# Patient Record
Sex: MALE | Race: BLACK OR AFRICAN AMERICAN | NOT HISPANIC OR LATINO | Employment: UNEMPLOYED | URBAN - METROPOLITAN AREA
[De-identification: names, ages, dates, MRNs, and addresses within clinical notes are randomized per-mention and may not be internally consistent; named-entity substitution may affect disease eponyms.]

---

## 2018-10-26 ENCOUNTER — HOSPITAL ENCOUNTER (INPATIENT)
Facility: HOSPITAL | Age: 58
LOS: 3 days | Discharge: HOME | DRG: 378 | End: 2018-10-29
Attending: EMERGENCY MEDICINE | Admitting: FAMILY MEDICINE
Payer: MEDICAID

## 2018-10-26 DIAGNOSIS — K26.0 ACUTE DUODENAL ULCER WITH BLEEDING: ICD-10-CM

## 2018-10-26 DIAGNOSIS — K92.2 GI BLEED: ICD-10-CM

## 2018-10-26 DIAGNOSIS — K92.1 MELENA: Primary | ICD-10-CM

## 2018-10-26 LAB
ABO + RH BLD: NORMAL
ALBUMIN SERPL BCP-MCNC: 2.4 G/DL
ALP SERPL-CCNC: 51 U/L
ALT SERPL W/O P-5'-P-CCNC: 43 U/L
ANION GAP SERPL CALC-SCNC: 7 MMOL/L
APTT BLDCRRT: <21 SEC
AST SERPL-CCNC: 41 U/L
BASOPHILS # BLD AUTO: 0.01 K/UL
BASOPHILS NFR BLD: 0.1 %
BILIRUB SERPL-MCNC: 0.2 MG/DL
BILIRUB UR QL STRIP: NEGATIVE
BLD GP AB SCN CELLS X3 SERPL QL: NORMAL
BUN SERPL-MCNC: 30 MG/DL
CALCIUM SERPL-MCNC: 7.8 MG/DL
CHLORIDE SERPL-SCNC: 108 MMOL/L
CLARITY UR: CLEAR
CO2 SERPL-SCNC: 22 MMOL/L
COLOR UR: YELLOW
CREAT SERPL-MCNC: 1 MG/DL
DIFFERENTIAL METHOD: ABNORMAL
EOSINOPHIL # BLD AUTO: 0 K/UL
EOSINOPHIL NFR BLD: 0.1 %
ERYTHROCYTE [DISTWIDTH] IN BLOOD BY AUTOMATED COUNT: 13.1 %
EST. GFR  (AFRICAN AMERICAN): >60 ML/MIN/1.73 M^2
EST. GFR  (NON AFRICAN AMERICAN): >60 ML/MIN/1.73 M^2
GLUCOSE SERPL-MCNC: 110 MG/DL
GLUCOSE UR QL STRIP: NEGATIVE
HCT VFR BLD AUTO: 21.8 %
HGB BLD-MCNC: 7.7 G/DL
HGB UR QL STRIP: ABNORMAL
INR PPP: 1.1
KETONES UR QL STRIP: NEGATIVE
LEUKOCYTE ESTERASE UR QL STRIP: NEGATIVE
LIPASE SERPL-CCNC: 37 U/L
LYMPHOCYTES # BLD AUTO: 1.9 K/UL
LYMPHOCYTES NFR BLD: 12.2 %
MCH RBC QN AUTO: 35.2 PG
MCHC RBC AUTO-ENTMCNC: 35.3 G/DL
MCV RBC AUTO: 100 FL
MICROSCOPIC COMMENT: NORMAL
MONOCYTES # BLD AUTO: 1.8 K/UL
MONOCYTES NFR BLD: 12 %
NEUTROPHILS # BLD AUTO: 11.6 K/UL
NEUTROPHILS NFR BLD: 75.6 %
NITRITE UR QL STRIP: NEGATIVE
PH UR STRIP: 6 [PH] (ref 5–8)
PLATELET # BLD AUTO: 217 K/UL
PMV BLD AUTO: 10.1 FL
POTASSIUM SERPL-SCNC: 3.9 MMOL/L
PROT SERPL-MCNC: 5 G/DL
PROT UR QL STRIP: NEGATIVE
PROTHROMBIN TIME: 10.9 SEC
RBC # BLD AUTO: 2.19 M/UL
RBC #/AREA URNS HPF: 2 /HPF (ref 0–4)
SODIUM SERPL-SCNC: 137 MMOL/L
SP GR UR STRIP: 1.01 (ref 1–1.03)
TROPONIN I SERPL DL<=0.01 NG/ML-MCNC: 0.02 NG/ML
URN SPEC COLLECT METH UR: ABNORMAL
UROBILINOGEN UR STRIP-ACNC: NEGATIVE EU/DL
WBC # BLD AUTO: 15.3 K/UL

## 2018-10-26 PROCEDURE — 63600175 PHARM REV CODE 636 W HCPCS: Performed by: EMERGENCY MEDICINE

## 2018-10-26 PROCEDURE — C9113 INJ PANTOPRAZOLE SODIUM, VIA: HCPCS | Performed by: EMERGENCY MEDICINE

## 2018-10-26 PROCEDURE — 93005 ELECTROCARDIOGRAM TRACING: CPT

## 2018-10-26 PROCEDURE — 96375 TX/PRO/DX INJ NEW DRUG ADDON: CPT

## 2018-10-26 PROCEDURE — 96360 HYDRATION IV INFUSION INIT: CPT | Mod: 59

## 2018-10-26 PROCEDURE — 25000003 PHARM REV CODE 250: Performed by: EMERGENCY MEDICINE

## 2018-10-26 PROCEDURE — 85730 THROMBOPLASTIN TIME PARTIAL: CPT

## 2018-10-26 PROCEDURE — 83690 ASSAY OF LIPASE: CPT

## 2018-10-26 PROCEDURE — 93010 ELECTROCARDIOGRAM REPORT: CPT | Mod: ,,, | Performed by: INTERNAL MEDICINE

## 2018-10-26 PROCEDURE — 96361 HYDRATE IV INFUSION ADD-ON: CPT

## 2018-10-26 PROCEDURE — 99291 CRITICAL CARE FIRST HOUR: CPT | Mod: 25

## 2018-10-26 PROCEDURE — 96365 THER/PROPH/DIAG IV INF INIT: CPT

## 2018-10-26 PROCEDURE — 85610 PROTHROMBIN TIME: CPT

## 2018-10-26 PROCEDURE — 85025 COMPLETE CBC W/AUTO DIFF WBC: CPT

## 2018-10-26 PROCEDURE — 86901 BLOOD TYPING SEROLOGIC RH(D): CPT

## 2018-10-26 PROCEDURE — 36415 COLL VENOUS BLD VENIPUNCTURE: CPT

## 2018-10-26 PROCEDURE — 81000 URINALYSIS NONAUTO W/SCOPE: CPT

## 2018-10-26 PROCEDURE — 84484 ASSAY OF TROPONIN QUANT: CPT

## 2018-10-26 PROCEDURE — 80053 COMPREHEN METABOLIC PANEL: CPT

## 2018-10-26 PROCEDURE — P9016 RBC LEUKOCYTES REDUCED: HCPCS

## 2018-10-26 PROCEDURE — 86920 COMPATIBILITY TEST SPIN: CPT

## 2018-10-26 PROCEDURE — 36430 TRANSFUSION BLD/BLD COMPNT: CPT

## 2018-10-26 PROCEDURE — 96376 TX/PRO/DX INJ SAME DRUG ADON: CPT

## 2018-10-26 PROCEDURE — 11000001 HC ACUTE MED/SURG PRIVATE ROOM

## 2018-10-26 PROCEDURE — 96366 THER/PROPH/DIAG IV INF ADDON: CPT

## 2018-10-26 RX ORDER — ONDANSETRON 2 MG/ML
4 INJECTION INTRAMUSCULAR; INTRAVENOUS
Status: COMPLETED | OUTPATIENT
Start: 2018-10-26 | End: 2018-10-26

## 2018-10-26 RX ORDER — PANTOPRAZOLE SODIUM 40 MG/10ML
80 INJECTION, POWDER, LYOPHILIZED, FOR SOLUTION INTRAVENOUS
Status: COMPLETED | OUTPATIENT
Start: 2018-10-26 | End: 2018-10-26

## 2018-10-26 RX ORDER — HYDROCODONE BITARTRATE AND ACETAMINOPHEN 500; 5 MG/1; MG/1
TABLET ORAL
Status: DISCONTINUED | OUTPATIENT
Start: 2018-10-26 | End: 2018-10-29 | Stop reason: HOSPADM

## 2018-10-26 RX ORDER — NIFEDIPINE 10 MG/1
20 CAPSULE ORAL EVERY 8 HOURS
COMMUNITY

## 2018-10-26 RX ADMIN — DEXTROSE 8 MG/HR: 50 INJECTION, SOLUTION INTRAVENOUS at 11:10

## 2018-10-26 RX ADMIN — ONDANSETRON 4 MG: 2 INJECTION INTRAMUSCULAR; INTRAVENOUS at 08:10

## 2018-10-26 RX ADMIN — SODIUM CHLORIDE 1000 ML: 0.9 INJECTION, SOLUTION INTRAVENOUS at 10:10

## 2018-10-26 RX ADMIN — PANTOPRAZOLE SODIUM 80 MG: 40 INJECTION, POWDER, FOR SOLUTION INTRAVENOUS at 10:10

## 2018-10-27 ENCOUNTER — ANESTHESIA EVENT (OUTPATIENT)
Dept: ENDOSCOPY | Facility: HOSPITAL | Age: 58
DRG: 378 | End: 2018-10-27
Payer: MEDICAID

## 2018-10-27 ENCOUNTER — ANESTHESIA (OUTPATIENT)
Dept: ENDOSCOPY | Facility: HOSPITAL | Age: 58
DRG: 378 | End: 2018-10-27
Payer: MEDICAID

## 2018-10-27 DIAGNOSIS — Z12.11 COLON CANCER SCREENING: Primary | ICD-10-CM

## 2018-10-27 DIAGNOSIS — K26.9 DUODENAL ULCER: Primary | ICD-10-CM

## 2018-10-27 DIAGNOSIS — K22.9 IRREGULAR Z LINE OF ESOPHAGUS: ICD-10-CM

## 2018-10-27 PROBLEM — Z78.9 ALCOHOL USE: Status: ACTIVE | Noted: 2018-10-27

## 2018-10-27 PROBLEM — Z72.0 TOBACCO USE: Status: ACTIVE | Noted: 2018-10-27

## 2018-10-27 PROBLEM — I10 HYPERTENSION: Status: ACTIVE | Noted: 2018-10-27

## 2018-10-27 PROBLEM — K26.0 ACUTE DUODENAL ULCER WITH BLEEDING: Status: ACTIVE | Noted: 2018-10-27

## 2018-10-27 PROBLEM — D72.829 LEUKOCYTOSIS: Status: ACTIVE | Noted: 2018-10-27

## 2018-10-27 LAB
BASOPHILS # BLD AUTO: 0.01 K/UL
BASOPHILS # BLD AUTO: 0.02 K/UL
BASOPHILS # BLD AUTO: 0.02 K/UL
BASOPHILS NFR BLD: 0.1 %
BASOPHILS NFR BLD: 0.1 %
BASOPHILS NFR BLD: 0.2 %
BLD PROD TYP BPU: NORMAL
BLOOD UNIT EXPIRATION DATE: NORMAL
BLOOD UNIT TYPE CODE: 6200
BLOOD UNIT TYPE: NORMAL
CODING SYSTEM: NORMAL
DIFFERENTIAL METHOD: ABNORMAL
DISPENSE STATUS: NORMAL
EOSINOPHIL # BLD AUTO: 0 K/UL
EOSINOPHIL # BLD AUTO: 0 K/UL
EOSINOPHIL # BLD AUTO: 0.1 K/UL
EOSINOPHIL NFR BLD: 0.1 %
EOSINOPHIL NFR BLD: 0.1 %
EOSINOPHIL NFR BLD: 0.4 %
ERYTHROCYTE [DISTWIDTH] IN BLOOD BY AUTOMATED COUNT: 14.6 %
ERYTHROCYTE [DISTWIDTH] IN BLOOD BY AUTOMATED COUNT: 15.7 %
ERYTHROCYTE [DISTWIDTH] IN BLOOD BY AUTOMATED COUNT: 16.3 %
HCT VFR BLD AUTO: 22.5 %
HCT VFR BLD AUTO: 22.9 %
HCT VFR BLD AUTO: 24.5 %
HGB BLD-MCNC: 7.8 G/DL
HGB BLD-MCNC: 7.9 G/DL
HGB BLD-MCNC: 8.1 G/DL
HGB BLD-MCNC: 8.4 G/DL
LYMPHOCYTES # BLD AUTO: 2.8 K/UL
LYMPHOCYTES # BLD AUTO: 3.1 K/UL
LYMPHOCYTES # BLD AUTO: 4 K/UL
LYMPHOCYTES NFR BLD: 21.1 %
LYMPHOCYTES NFR BLD: 21.7 %
LYMPHOCYTES NFR BLD: 25.3 %
MCH RBC QN AUTO: 32.1 PG
MCH RBC QN AUTO: 33.2 PG
MCH RBC QN AUTO: 33.9 PG
MCHC RBC AUTO-ENTMCNC: 34.3 G/DL
MCHC RBC AUTO-ENTMCNC: 35.1 G/DL
MCHC RBC AUTO-ENTMCNC: 35.4 G/DL
MCV RBC AUTO: 94 FL
MCV RBC AUTO: 94 FL
MCV RBC AUTO: 97 FL
MONOCYTES # BLD AUTO: 1.2 K/UL
MONOCYTES # BLD AUTO: 1.6 K/UL
MONOCYTES # BLD AUTO: 1.9 K/UL
MONOCYTES NFR BLD: 12.1 %
MONOCYTES NFR BLD: 13.3 %
MONOCYTES NFR BLD: 7.9 %
NEUTROPHILS # BLD AUTO: 10.4 K/UL
NEUTROPHILS # BLD AUTO: 8.8 K/UL
NEUTROPHILS # BLD AUTO: 9.3 K/UL
NEUTROPHILS NFR BLD: 64.8 %
NEUTROPHILS NFR BLD: 66.2 %
NEUTROPHILS NFR BLD: 66.6 %
NUM UNITS TRANS PACKED RBC: NORMAL
PLATELET # BLD AUTO: 152 K/UL
PLATELET # BLD AUTO: 161 K/UL
PLATELET # BLD AUTO: 179 K/UL
PMV BLD AUTO: 10.6 FL
PMV BLD AUTO: 9.9 FL
PMV BLD AUTO: 9.9 FL
RBC # BLD AUTO: 2.33 M/UL
RBC # BLD AUTO: 2.44 M/UL
RBC # BLD AUTO: 2.62 M/UL
WBC # BLD AUTO: 13.24 K/UL
WBC # BLD AUTO: 14.4 K/UL
WBC # BLD AUTO: 15.65 K/UL

## 2018-10-27 PROCEDURE — 37000009 HC ANESTHESIA EA ADD 15 MINS: Performed by: INTERNAL MEDICINE

## 2018-10-27 PROCEDURE — 0W3P8ZZ CONTROL BLEEDING IN GASTROINTESTINAL TRACT, VIA NATURAL OR ARTIFICIAL OPENING ENDOSCOPIC: ICD-10-PCS | Performed by: INTERNAL MEDICINE

## 2018-10-27 PROCEDURE — 63600175 PHARM REV CODE 636 W HCPCS: Performed by: NURSE ANESTHETIST, CERTIFIED REGISTERED

## 2018-10-27 PROCEDURE — 27201012 HC FORCEPS, HOT/COLD, DISP: Performed by: INTERNAL MEDICINE

## 2018-10-27 PROCEDURE — 37000008 HC ANESTHESIA 1ST 15 MINUTES: Performed by: INTERNAL MEDICINE

## 2018-10-27 PROCEDURE — 27201028 HC NEEDLE, SCLERO: Performed by: INTERNAL MEDICINE

## 2018-10-27 PROCEDURE — 43239 EGD BIOPSY SINGLE/MULTIPLE: CPT | Performed by: INTERNAL MEDICINE

## 2018-10-27 PROCEDURE — P9016 RBC LEUKOCYTES REDUCED: HCPCS

## 2018-10-27 PROCEDURE — 43255 EGD CONTROL BLEEDING ANY: CPT | Mod: 59,,, | Performed by: INTERNAL MEDICINE

## 2018-10-27 PROCEDURE — C9113 INJ PANTOPRAZOLE SODIUM, VIA: HCPCS | Performed by: EMERGENCY MEDICINE

## 2018-10-27 PROCEDURE — 25000003 PHARM REV CODE 250: Performed by: FAMILY MEDICINE

## 2018-10-27 PROCEDURE — 99232 SBSQ HOSP IP/OBS MODERATE 35: CPT | Mod: 25,,, | Performed by: INTERNAL MEDICINE

## 2018-10-27 PROCEDURE — 25000003 PHARM REV CODE 250: Performed by: INTERNAL MEDICINE

## 2018-10-27 PROCEDURE — 21400001 HC TELEMETRY ROOM

## 2018-10-27 PROCEDURE — 25000003 PHARM REV CODE 250: Performed by: EMERGENCY MEDICINE

## 2018-10-27 PROCEDURE — 85018 HEMOGLOBIN: CPT

## 2018-10-27 PROCEDURE — 43239 EGD BIOPSY SINGLE/MULTIPLE: CPT | Mod: ,,, | Performed by: INTERNAL MEDICINE

## 2018-10-27 PROCEDURE — 36430 TRANSFUSION BLD/BLD COMPNT: CPT

## 2018-10-27 PROCEDURE — 85025 COMPLETE CBC W/AUTO DIFF WBC: CPT | Mod: 91

## 2018-10-27 PROCEDURE — 43255 EGD CONTROL BLEEDING ANY: CPT | Performed by: INTERNAL MEDICINE

## 2018-10-27 PROCEDURE — 27201038 HC PROBE, BI-POLAR: Performed by: INTERNAL MEDICINE

## 2018-10-27 PROCEDURE — 88305 TISSUE EXAM BY PATHOLOGIST: CPT | Performed by: PATHOLOGY

## 2018-10-27 PROCEDURE — 94761 N-INVAS EAR/PLS OXIMETRY MLT: CPT

## 2018-10-27 PROCEDURE — 0DB98ZX EXCISION OF DUODENUM, VIA NATURAL OR ARTIFICIAL OPENING ENDOSCOPIC, DIAGNOSTIC: ICD-10-PCS | Performed by: INTERNAL MEDICINE

## 2018-10-27 PROCEDURE — 25000003 PHARM REV CODE 250: Performed by: NURSE ANESTHETIST, CERTIFIED REGISTERED

## 2018-10-27 PROCEDURE — S4991 NICOTINE PATCH NONLEGEND: HCPCS | Performed by: FAMILY MEDICINE

## 2018-10-27 PROCEDURE — 63600175 PHARM REV CODE 636 W HCPCS: Performed by: EMERGENCY MEDICINE

## 2018-10-27 PROCEDURE — 88305 TISSUE EXAM BY PATHOLOGIST: CPT | Mod: 26,,, | Performed by: PATHOLOGY

## 2018-10-27 RX ORDER — HYDROCODONE BITARTRATE AND ACETAMINOPHEN 500; 5 MG/1; MG/1
TABLET ORAL
Status: DISCONTINUED | OUTPATIENT
Start: 2018-10-27 | End: 2018-10-29 | Stop reason: HOSPADM

## 2018-10-27 RX ORDER — SODIUM CHLORIDE, SODIUM LACTATE, POTASSIUM CHLORIDE, CALCIUM CHLORIDE 600; 310; 30; 20 MG/100ML; MG/100ML; MG/100ML; MG/100ML
INJECTION, SOLUTION INTRAVENOUS CONTINUOUS
Status: DISCONTINUED | OUTPATIENT
Start: 2018-10-27 | End: 2018-10-27

## 2018-10-27 RX ORDER — ACETAMINOPHEN 325 MG/1
650 TABLET ORAL EVERY 8 HOURS PRN
Status: DISCONTINUED | OUTPATIENT
Start: 2018-10-27 | End: 2018-10-29 | Stop reason: HOSPADM

## 2018-10-27 RX ORDER — POLYETHYLENE GLYCOL 3350, SODIUM SULFATE ANHYDROUS, SODIUM BICARBONATE, SODIUM CHLORIDE, POTASSIUM CHLORIDE 236; 22.74; 6.74; 5.86; 2.97 G/4L; G/4L; G/4L; G/4L; G/4L
4 POWDER, FOR SOLUTION ORAL ONCE
Qty: 4000 ML | Refills: 0 | Status: SHIPPED | OUTPATIENT
Start: 2018-10-27 | End: 2018-10-27

## 2018-10-27 RX ORDER — IBUPROFEN 200 MG
1 TABLET ORAL DAILY
Status: DISCONTINUED | OUTPATIENT
Start: 2018-10-27 | End: 2018-10-29 | Stop reason: HOSPADM

## 2018-10-27 RX ORDER — LIDOCAINE HYDROCHLORIDE 10 MG/ML
INJECTION INFILTRATION; PERINEURAL
Status: DISCONTINUED | OUTPATIENT
Start: 2018-10-27 | End: 2018-10-27

## 2018-10-27 RX ORDER — PROPOFOL 10 MG/ML
VIAL (ML) INTRAVENOUS
Status: DISCONTINUED | OUTPATIENT
Start: 2018-10-27 | End: 2018-10-27

## 2018-10-27 RX ORDER — HYDROCODONE BITARTRATE AND ACETAMINOPHEN 5; 325 MG/1; MG/1
1 TABLET ORAL EVERY 4 HOURS PRN
Status: DISCONTINUED | OUTPATIENT
Start: 2018-10-27 | End: 2018-10-29 | Stop reason: HOSPADM

## 2018-10-27 RX ORDER — SODIUM CHLORIDE 9 MG/ML
INJECTION, SOLUTION INTRAVENOUS CONTINUOUS
Status: DISCONTINUED | OUTPATIENT
Start: 2018-10-27 | End: 2018-10-28

## 2018-10-27 RX ORDER — SODIUM CHLORIDE, SODIUM LACTATE, POTASSIUM CHLORIDE, CALCIUM CHLORIDE 600; 310; 30; 20 MG/100ML; MG/100ML; MG/100ML; MG/100ML
INJECTION, SOLUTION INTRAVENOUS CONTINUOUS PRN
Status: DISCONTINUED | OUTPATIENT
Start: 2018-10-27 | End: 2018-10-27

## 2018-10-27 RX ORDER — ONDANSETRON 2 MG/ML
4 INJECTION INTRAMUSCULAR; INTRAVENOUS EVERY 8 HOURS PRN
Status: DISCONTINUED | OUTPATIENT
Start: 2018-10-27 | End: 2018-10-29 | Stop reason: HOSPADM

## 2018-10-27 RX ORDER — SODIUM CHLORIDE 0.9 % (FLUSH) 0.9 %
3 SYRINGE (ML) INJECTION
Status: DISCONTINUED | OUTPATIENT
Start: 2018-10-27 | End: 2018-10-27 | Stop reason: HOSPADM

## 2018-10-27 RX ADMIN — PROPOFOL 50 MG: 10 INJECTION, EMULSION INTRAVENOUS at 02:10

## 2018-10-27 RX ADMIN — LIDOCAINE HYDROCHLORIDE 2 ML: 10 INJECTION, SOLUTION INFILTRATION; PERINEURAL at 02:10

## 2018-10-27 RX ADMIN — SODIUM CHLORIDE: 0.9 INJECTION, SOLUTION INTRAVENOUS at 02:10

## 2018-10-27 RX ADMIN — Medication 1 PATCH: at 10:10

## 2018-10-27 RX ADMIN — PROPOFOL 100 MG: 10 INJECTION, EMULSION INTRAVENOUS at 02:10

## 2018-10-27 RX ADMIN — DEXTROSE 8 MG/HR: 50 INJECTION, SOLUTION INTRAVENOUS at 04:10

## 2018-10-27 RX ADMIN — PROPOFOL 75 MG: 10 INJECTION, EMULSION INTRAVENOUS at 02:10

## 2018-10-27 RX ADMIN — SODIUM CHLORIDE: 0.9 INJECTION, SOLUTION INTRAVENOUS at 12:10

## 2018-10-27 RX ADMIN — SODIUM CHLORIDE, SODIUM LACTATE, POTASSIUM CHLORIDE, AND CALCIUM CHLORIDE: 600; 310; 30; 20 INJECTION, SOLUTION INTRAVENOUS at 02:10

## 2018-10-27 RX ADMIN — SODIUM CHLORIDE, SODIUM LACTATE, POTASSIUM CHLORIDE, AND CALCIUM CHLORIDE: .6; .31; .03; .02 INJECTION, SOLUTION INTRAVENOUS at 02:10

## 2018-10-27 NOTE — ASSESSMENT & PLAN NOTE
Melena, without ongoing bleeding at this time but did not respond appropriately to 2 units pRBCs.   -Plan for EGD today to eval source of melena  -Monitor Hb closely and maintain >8  -Continue PPI IV  -Avoid ASA/NSAIDS

## 2018-10-27 NOTE — ED NOTES
Pt denies any injuries from falls, states he never hit his head, denies LOC, denies bruises/abrasions/complaints.  Pt c/o feeling generalized weakness and cramping discomfort with BMs only.  No obvious injuries observed on eval, extremities x4 +ROM without pain, CSMs intact.

## 2018-10-27 NOTE — SUBJECTIVE & OBJECTIVE
Past Medical History:   Diagnosis Date    Cardiac hypertrophy     Hypertension        History reviewed. No pertinent surgical history.    Review of patient's allergies indicates:  No Known Allergies    No current facility-administered medications on file prior to encounter.      Current Outpatient Medications on File Prior to Encounter   Medication Sig    NIFEdipine (PROCARDIA) 10 MG Cap Take 20 mg by mouth every 8 (eight) hours.     Family History     None        Tobacco Use    Smoking status: Current Every Day Smoker     Packs/day: 1.00     Types: Cigarettes    Smokeless tobacco: Never Used   Substance and Sexual Activity    Alcohol use: Yes     Alcohol/week: 3.6 oz     Types: 6 Cans of beer per week    Drug use: No    Sexual activity: Not on file     Review of Systems   Constitutional: Negative.    HENT: Negative.    Eyes: Negative.    Respiratory: Negative.    Cardiovascular: Negative.    Gastrointestinal: Positive for abdominal pain, blood in stool and diarrhea.   Endocrine: Negative.    Genitourinary: Negative.    Musculoskeletal: Negative.    Skin: Negative.    Allergic/Immunologic: Negative.    Neurological: Negative.    Hematological: Negative.    Psychiatric/Behavioral: Negative.      Objective:     Vital Signs (Most Recent):  Temp: 98.7 °F (37.1 °C) (10/27/18 0015)  Pulse: (!) 112 (10/27/18 0015)  Resp: 20 (10/27/18 0015)  BP: 128/83 (10/27/18 0015)  SpO2: 100 % (10/27/18 0015) Vital Signs (24h Range):  Temp:  [98.7 °F (37.1 °C)-98.8 °F (37.1 °C)] 98.7 °F (37.1 °C)  Pulse:  [107-123] 112  Resp:  [16-33] 20  SpO2:  [100 %] 100 %  BP: (109-148)/(55-85) 128/83     Weight: 103 kg (227 lb)  Body mass index is 31.66 kg/m².    Physical Exam   Constitutional: He is oriented to person, place, and time. He appears well-developed and well-nourished.   Looks older than his stated age   HENT:   Head: Normocephalic and atraumatic.   Right Ear: External ear normal.   Left Ear: External ear normal.   Nose: Nose  normal.   Mouth/Throat: Oropharynx is clear and moist.   Eyes: Conjunctivae and EOM are normal. Pupils are equal, round, and reactive to light.   Neck: Normal range of motion. Neck supple.   Cardiovascular: Normal rate, regular rhythm, normal heart sounds and intact distal pulses.   Pulmonary/Chest: Effort normal and breath sounds normal.   Abdominal: Soft. He exhibits no mass. There is tenderness (epigastric). There is no rebound.   Genitourinary: Rectal exam shows guaiac positive stool.   Musculoskeletal: Normal range of motion.   Neurological: He is alert and oriented to person, place, and time.   Skin: Skin is warm and dry. Capillary refill takes less than 2 seconds.   Psychiatric: He has a normal mood and affect.         CRANIAL NERVES     CN III, IV, VI   Pupils are equal, round, and reactive to light.  Extraocular motions are normal.        Significant Labs:   Recent Lab Results       10/26/18  2250   10/26/18  2201   10/26/18  1850        Unit Blood Type Code   6200[P]          6200[P]       Unit Expiration   [id number][P]          [id number][P]       Unit Blood Type   A POS[P]          A POS[P]       Albumin     2.4     Alkaline Phosphatase     51     ALT     43     Anion Gap     7     Appearance, UA Clear         aPTT     <21.0  Comment:  aPTT therapeutic range = 39-69 seconds     AST     41     Baso #     0.01     Basophil%     0.1     Bilirubin (UA) Negative         Total Bilirubin     0.2  Comment:  For infants and newborns, interpretation of results should be based  on gestational age, weight and in agreement with clinical  observations.  Premature Infant recommended reference ranges:  Up to 24 hours.............<8.0 mg/dL  Up to 48 hours............<12.0 mg/dL  3-5 days..................<15.0 mg/dL  6-29 days.................<15.0 mg/dL       BUN, Bld     30     Calcium     7.8     Chloride     108     CO2     22     CODING SYSTEM   ENTL162[P]          MEZS817[P]       Color, UA Yellow          Creatinine     1.0     Differential Method     Automated     DISPENSE STATUS   CROSSMATCHED[P]          ISSUED[P]       eGFR if      >60     eGFR if non      >60  Comment:  Calculation used to obtain the estimated glomerular filtration  rate (eGFR) is the CKD-EPI equation.        Eos #     0.0     Eosinophil%     0.1     Glucose     110     Glucose, UA Negative         Gran # (ANC)     11.6     Gran%     75.6     Group & Rh   AB POS       Hematocrit     21.8     Hemoglobin     7.7     INDIRECT ANDREA   NEG       Coumadin Monitoring INR     1.1  Comment:  Coumadin Therapy:  2.0 - 3.0 for INR for all indicators except mechanical heart valves  and antiphospholipid syndromes which should use 2.5 - 3.5.       Ketones, UA Negative         Leukocytes, UA Negative         Lipase     37     Lymph #     1.9     Lymph%     12.2     MCH     35.2     MCHC     35.3     MCV     100     Microscopic Comment SEE COMMENT  Comment:  Other formed elements not mentioned in the report are not   present in the microscopic examination.            Mono #     1.8     Mono%     12.0     MPV     10.1     Nitrite, UA Negative         Occult Blood UA 1+         pH, UA 6.0         Platelets     217     Potassium     3.9     PRODUCT CODE   M9107P14[P]          L0218Y05[P]       Total Protein     5.0     Protein, UA Negative  Comment:  Recommend a 24 hour urine protein or a urine   protein/creatinine ratio if globulin induced proteinuria is  clinically suspected.           Protime     10.9     RBC     2.19     RBC, UA 2         RDW     13.1     Sodium     137     Specific [place], UA 1.010         Specimen UA Urine, Unspecified         Troponin I     0.023  Comment:  The reference interval for Troponin I represents the 99th percentile   cutoff   for our facility and is consistent with 3rd generation assay   performance.       UNIT NUMBER   [id number][P]          [id number][P]       Urobilinogen, UA Negative          WBC     15.30           Significant Imaging:   Imaging Results          X-Ray Chest AP Portable (Final result)  Result time 10/26/18 22:10:34    Final result by Peter Boone MD (10/26/18 22:10:34)                 Impression:      No acute process seen.      Electronically signed by: Peter Boone MD  Date:    10/26/2018  Time:    22:10             Narrative:    EXAMINATION:  XR CHEST AP PORTABLE    CLINICAL HISTORY:  tachycardia and fatigue;    FINDINGS:  Single view of the chest.  Aorta demonstrates atherosclerotic disease.    Cardiac silhouette is normal.  The lungs demonstrate no evidence of active disease.  No evidence of pleural effusion or pneumothorax.  Bones appear intact.

## 2018-10-27 NOTE — HOSPITAL COURSE
Admitted for evaluation and treatment of GI bleeding.  Started IV Pantoprazole and held any anti-coagulation.  Evaluation by Gastroenterology and expect endoscopy.  Anemic on admission with Hgb 7.7 and received 2 units PRBC.  Hgb level increased to 8.4 and he remained hemodynamically stable.  Upper Endoscopy 27 October revealed an actively bleeding duodenal ulcer.  Injected epinephrine to control bleeding.  Evening post-procedure Hgb decreased to 7.8, transfused 1 unit PRBC.  Hgb 8.9 the following morning and symptoms resolved.  Continuing IV Pantoprazole and advancing diet.  Discharge plan to follow up with PCP, continue Pantoprazole, abstain from alcohol, and avoid ASA/NSAIDs.  Follow up with GI for repeat endoscopy 12 weeks.

## 2018-10-27 NOTE — ASSESSMENT & PLAN NOTE
He presents with signs/symptoms for UGIB likely caused by ASA/EtOH/tobacco use.  Possible source PUD, Erosive Gastritis/Espohagitis, varcies, etc given his history.  Admitted and already received 2 units PRBC transfusion per ED.  Started on PPI infusion.  GI evaluated and expect endoscopy.  Additionally, he endorsed several falls over the last two days and he took 60 mg Procardia from a family member instead of his 10 mg dose because he was out.

## 2018-10-27 NOTE — PROGRESS NOTES
[facility] Hospital Medicine  Progress Note    Patient Name: Tomas Zuniga Jr.  MRN: [id number]  Patient Class: IP- Inpatient   Admission Date: 10/26/2018  Length of Stay: 1 days  Attending Physician: Aleksey Amaro MD  Primary Care Provider: ISAI Díaz        Subjective:     Principal Problem:GI bleed    HPI:  Mr. Zuniga is a 59 yo Black male with history of HTN who presents to the Ed c/o dark tarry stools and abdominal discomfort. He contributes his symptoms to taking his daughter's procardia of 60 mg instead of his usual 10 mg. Upon presentation he was found anemic with H/H of 7/21 and BUN 30 with normal Creatinine. He states he has been having some diarrhea of his dark stools. He is a daily smoker of 1 ppd cigarettes and daily alcohol use. He takes a daily baby aspirin but denies any other NSAIDs.    Hospital Course:  Admitted for evaluation and treatment of GI bleeding.  Started IV Pantoprazole and held any anti-coagulation.  Evaluation by Gastroenterology and expect endoscopy.  Anemic on admission with Hgb 7.7 and received 2 units PRBC.  Hgb level increased to 8.4 and he remained hemodynamically stable.    Interval History:  Black tarry stools.  Denies nausea or vomiting.      Review of Systems   Constitutional: Negative.  Negative for chills and fever.   HENT: Negative.  Negative for congestion and sore throat.    Eyes: Negative.  Negative for visual disturbance.   Respiratory: Negative.  Negative for cough, shortness of breath and wheezing.    Cardiovascular: Negative.  Negative for chest pain.   Gastrointestinal: Positive for blood in stool. Negative for abdominal pain, diarrhea, nausea and vomiting.   Endocrine: Negative.    Genitourinary: Negative.    Musculoskeletal: Negative.  Negative for myalgias and neck stiffness.   Skin: Negative.  Negative for color change and pallor.   Allergic/Immunologic: Negative.    Neurological: Negative.    Hematological: Negative.     Psychiatric/Behavioral: Negative.    All other systems reviewed and are negative.    Objective:     Vital Signs (Most Recent):  Temp: 98.3 °F (36.8 °C) (10/27/18 1356)  Pulse: 91 (10/27/18 1356)  Resp: 20 (10/27/18 1356)  BP: 128/69 (10/27/18 1356)  SpO2: 100 % (10/27/18 1356) Vital Signs (24h Range):  Temp:  [98.3 °F (36.8 °C)-99.5 °F (37.5 °C)] 98.3 °F (36.8 °C)  Pulse:  [] 91  Resp:  [16-43] 20  SpO2:  [98 %-100 %] 100 %  BP: ()/(51-85) 128/69     Weight: 103 kg (227 lb)  Body mass index is 31.66 kg/m².    Intake/Output Summary (Last 24 hours) at 10/27/2018 1407  Last data filed at 10/27/2018 1300  Gross per 24 hour   Intake 2173.34 ml   Output --   Net 2173.34 ml      Physical Exam   Constitutional: He is oriented to person, place, and time. He appears well-developed and well-nourished. No distress.   HENT:   Head: Normocephalic and atraumatic.   Mouth/Throat: Oropharynx is clear and moist.   Eyes: Conjunctivae and EOM are normal. Pupils are equal, round, and reactive to light.   Neck: No JVD present. No thyromegaly present.   Cardiovascular: Normal rate, regular rhythm and normal heart sounds. Exam reveals no gallop and no friction rub.   No murmur heard.  Pulmonary/Chest: Effort normal and breath sounds normal. No respiratory distress. He has no wheezes. He has no rales.   Abdominal: Soft. Bowel sounds are normal. He exhibits no distension. There is no tenderness. There is no rebound and no guarding.   Musculoskeletal: Normal range of motion. He exhibits no edema or tenderness.   Lymphadenopathy:     He has no cervical adenopathy.   Neurological: He is alert and oriented to person, place, and time. He has normal reflexes. He displays normal reflexes. No cranial nerve deficit.   Skin: Skin is warm and dry. No rash noted. He is not diaphoretic. No erythema.   Psychiatric: He has a normal mood and affect. His behavior is normal. Judgment and thought content normal.       Significant Labs: All  pertinent labs within the past 24 hours have been reviewed.    Significant Imaging: I have reviewed all pertinent imaging results/findings within the past 24 hours.    Assessment/Plan:      * GI bleed    He presents with signs/symptoms for UGIB likely caused by ASA/EtOH/tobacco use.  Possible source PUD, Erosive Gastritis/Espohagitis, varcies, etc given his history.  Admitted and already received 2 units PRBC transfusion per ED.  Started on PPI infusion.  GI evaluated and expect endoscopy.  Additionally, he endorsed several falls over the last two days and he took 60 mg Procardia from a family member instead of his 10 mg dose because he was out.     Leukocytosis    Likely reactive from his UGIB doubt infection cause   correct anemia and monitor       Alcohol use    Daily beer drinker  Monitor for withdrawals       Tobacco use    Will place nicotine patch  Counseled on cessation       Hypertension    Chronic will monitor and PRN IV medications         VTE Risk Mitigation (From admission, onward)        Ordered     IP VTE HIGH RISK PATIENT  Once      10/27/18 1359     Reason for No Pharmacological VTE Prophylaxis  Once      10/27/18 1359     Place sequential compression device  Until discontinued      10/27/18 0039              Aleksey Amaro MD  Department of Hospital Medicine   [facility]

## 2018-10-27 NOTE — SUBJECTIVE & OBJECTIVE
Interval History:  Black tarry stools.  Denies nausea or vomiting.      Review of Systems   Constitutional: Negative.  Negative for chills and fever.   HENT: Negative.  Negative for congestion and sore throat.    Eyes: Negative.  Negative for visual disturbance.   Respiratory: Negative.  Negative for cough, shortness of breath and wheezing.    Cardiovascular: Negative.  Negative for chest pain.   Gastrointestinal: Positive for blood in stool. Negative for abdominal pain, diarrhea, nausea and vomiting.   Endocrine: Negative.    Genitourinary: Negative.    Musculoskeletal: Negative.  Negative for myalgias and neck stiffness.   Skin: Negative.  Negative for color change and pallor.   Allergic/Immunologic: Negative.    Neurological: Negative.    Hematological: Negative.    Psychiatric/Behavioral: Negative.    All other systems reviewed and are negative.    Objective:     Vital Signs (Most Recent):  Temp: 98.3 °F (36.8 °C) (10/27/18 1356)  Pulse: 91 (10/27/18 1356)  Resp: 20 (10/27/18 1356)  BP: 128/69 (10/27/18 1356)  SpO2: 100 % (10/27/18 1356) Vital Signs (24h Range):  Temp:  [98.3 °F (36.8 °C)-99.5 °F (37.5 °C)] 98.3 °F (36.8 °C)  Pulse:  [] 91  Resp:  [16-43] 20  SpO2:  [98 %-100 %] 100 %  BP: ()/(51-85) 128/69     Weight: 103 kg (227 lb)  Body mass index is 31.66 kg/m².    Intake/Output Summary (Last 24 hours) at 10/27/2018 1407  Last data filed at 10/27/2018 1300  Gross per 24 hour   Intake 2173.34 ml   Output --   Net 2173.34 ml      Physical Exam   Constitutional: He is oriented to person, place, and time. He appears well-developed and well-nourished. No distress.   HENT:   Head: Normocephalic and atraumatic.   Mouth/Throat: Oropharynx is clear and moist.   Eyes: Conjunctivae and EOM are normal. Pupils are equal, round, and reactive to light.   Neck: No JVD present. No thyromegaly present.   Cardiovascular: Normal rate, regular rhythm and normal heart sounds. Exam reveals no gallop and no friction  rub.   No murmur heard.  Pulmonary/Chest: Effort normal and breath sounds normal. No respiratory distress. He has no wheezes. He has no rales.   Abdominal: Soft. Bowel sounds are normal. He exhibits no distension. There is no tenderness. There is no rebound and no guarding.   Musculoskeletal: Normal range of motion. He exhibits no edema or tenderness.   Lymphadenopathy:     He has no cervical adenopathy.   Neurological: He is alert and oriented to person, place, and time. He has normal reflexes. He displays normal reflexes. No cranial nerve deficit.   Skin: Skin is warm and dry. No rash noted. He is not diaphoretic. No erythema.   Psychiatric: He has a normal mood and affect. His behavior is normal. Judgment and thought content normal.       Significant Labs: All pertinent labs within the past 24 hours have been reviewed.    Significant Imaging: I have reviewed all pertinent imaging results/findings within the past 24 hours.

## 2018-10-27 NOTE — SUBJECTIVE & OBJECTIVE
Past Medical History:   Diagnosis Date    Cardiac hypertrophy     Hypertension        History reviewed. No pertinent surgical history.    Review of patient's allergies indicates:  No Known Allergies  Family History     None        Tobacco Use    Smoking status: Current Every Day Smoker     Packs/day: 1.00     Types: Cigarettes    Smokeless tobacco: Never Used   Substance and Sexual Activity    Alcohol use: Yes     Alcohol/week: 3.6 oz     Types: 6 Cans of beer per week    Drug use: No    Sexual activity: Not on file     Review of Systems   HENT: Negative.    Eyes: Negative.    Respiratory: Negative.    Cardiovascular: Negative.    Gastrointestinal: Positive for blood in stool.   Genitourinary: Negative.    Musculoskeletal: Negative.    Skin: Negative.    Neurological: Positive for weakness.   Psychiatric/Behavioral: Negative.      Objective:     Vital Signs (Most Recent):  Temp: 99.5 °F (37.5 °C) (10/27/18 0659)  Pulse: 96 (10/27/18 0902)  Resp: 19 (10/27/18 0902)  BP: 116/75 (10/27/18 0902)  SpO2: 99 % (10/27/18 0902) Vital Signs (24h Range):  Temp:  [98.4 °F (36.9 °C)-99.5 °F (37.5 °C)] 99.5 °F (37.5 °C)  Pulse:  [] 96  Resp:  [16-43] 19  SpO2:  [98 %-100 %] 99 %  BP: ()/(51-85) 116/75     Weight: 103 kg (227 lb) (10/26/18 1857)  Body mass index is 31.66 kg/m².      Intake/Output Summary (Last 24 hours) at 10/27/2018 1221  Last data filed at 10/27/2018 0400  Gross per 24 hour   Intake 650 ml   Output --   Net 650 ml       Lines/Drains/Airways     Peripheral Intravenous Line                 Peripheral IV - Single Lumen 10/26/18 1821 Left Antecubital less than 1 day         Peripheral IV - Single Lumen 10/26/18 2329 Right Antecubital less than 1 day                Physical Exam   Constitutional: He is oriented to person, place, and time. He appears well-developed and well-nourished. No distress.   HENT:   Head: Normocephalic and atraumatic.   Mouth/Throat: Oropharynx is clear and moist. No  oropharyngeal exudate.   Eyes: Conjunctivae are normal. Pupils are equal, round, and reactive to light. Right eye exhibits no discharge. Left eye exhibits no discharge. No scleral icterus.   Pulmonary/Chest: Effort normal and breath sounds normal. No respiratory distress. He has no wheezes.   Abdominal: Soft. He exhibits no distension. There is no tenderness.   Musculoskeletal: He exhibits no edema.   Neurological: He is alert and oriented to person, place, and time.   Psychiatric: He has a normal mood and affect. His behavior is normal.   Vitals reviewed.      Significant Labs:  CBC:   Recent Labs   Lab 10/27/18  0200 10/27/18  0410 10/27/18  1000   WBC 15.65* 14.40* 13.24*   HGB 7.9* 8.4* 8.1*   HCT 22.5* 24.5* 22.9*    152 161     CMP:   Recent Labs   Lab 10/26/18  1850      CALCIUM 7.8*   ALBUMIN 2.4*   PROT 5.0*      K 3.9   CO2 22*      BUN 30*   CREATININE 1.0   ALKPHOS 51*   ALT 43   AST 41*   BILITOT 0.2       Significant Imaging:  n/a

## 2018-10-27 NOTE — ED PROVIDER NOTES
SCRIBE #1 NOTE: I, Eri Bowden, am scribing for, and in the presence of, Charlie Card MD. I have scribed the entire note.      History      Chief Complaint   Patient presents with    Fatigue     N/V       Review of patient's allergies indicates:  No Known Allergies     HPI   HPI    10/26/2018, 7:22 PM   History obtained from the patient and ER nurse      History of Present Illness: Tomas Zuniga Jr. is a 58 y.o. male patient who presents to the Emergency Department for fatigue which onset a few days ago. Symptoms are constant and moderate in severity.  No mitigating or exacerbating factors reported. Per ER nurse, pt reported to EMS that he has been experiencing bloody diarrhea, abd discomfort, and generalized weakness. Associated sxs include n/v.  Patient denies any fever, chills, constipation, dysuria, hematuria, urinary frequency, CP, SOB, dizziness, extremity numbness, syncope, and all other sxs at this time. Pt reports running out of his Nifedipine 10 mg, and reports taking a family member's Nifedipine 60mg instead for the last few days. Last dose was yesterday. Pt reports new prescription of his own medication. No further complaints or concerns at this time.         Arrival mode: Ambulance service    PCP: ISAI Díaz       Past Medical History:  Past Medical History:   Diagnosis Date    Cardiac hypertrophy     Hypertension        Past Surgical History:  History reviewed. No pertinent surgical history.      Family History:  History reviewed. No pertinent family history.    Social History:  Social History     Tobacco Use    Smoking status: Current Every Day Smoker     Packs/day: 1.00     Types: Cigarettes    Smokeless tobacco: Never Used   Substance and Sexual Activity    Alcohol use: Yes     Alcohol/week: 3.6 oz     Types: 6 Cans of beer per week    Drug use: No    Sexual activity: Unknown       ROS   Review of Systems   Constitutional: Positive for fatigue. Negative for chills  and fever.   HENT: Negative for sore throat.    Respiratory: Negative for shortness of breath.    Cardiovascular: Negative for chest pain.   Gastrointestinal: Positive for abdominal pain, blood in stool, diarrhea, nausea and vomiting. Negative for constipation.   Genitourinary: Negative for dysuria, frequency and hematuria.   Musculoskeletal: Negative for back pain.   Skin: Negative for rash.   Neurological: Positive for weakness. Negative for dizziness, syncope and numbness.   Hematological: Does not bruise/bleed easily.   All other systems reviewed and are negative.      Physical Exam      Initial Vitals [10/26/18 1819]   BP Pulse Resp Temp SpO2   128/62 (!) 116 20 98.8 °F (37.1 °C) 100 %      MAP       --          Physical Exam  Nursing Notes and Vital Signs Reviewed.  Constitutional: Patient is in no acute distress. Well-developed and well-nourished.  Head: Atraumatic. Normocephalic.  Eyes: PERRL. EOM intact. Conjunctivae are not pale. No scleral icterus.  ENT: Mucous membranes are moist. Oropharynx is clear and symmetric.    Neck: Supple. Full ROM. No lymphadenopathy.  Cardiovascular: Tachycardic. Regular rhythm. No murmurs, rubs, or gallops. Distal pulses are 2+ and symmetric.  Pulmonary/Chest: No respiratory distress. Clear to auscultation bilaterally. No wheezing or rales.  Abdominal: Soft and non-distended.  There is no tenderness.  No rebound, guarding, or rigidity.   Musculoskeletal: Moves all extremities. No obvious deformities. No edema.   Skin: Warm and dry.  Neurological:  Alert, awake, and appropriate.  Normal speech.  No acute focal neurological deficits are appreciated.  Psychiatric: Normal affect. Good eye contact. Appropriate in content.    ED Course    Critical Care  Date/Time: 10/26/2018 10:47 PM  Performed by: Charlie Card MD  Authorized by: Charlie Card MD   Direct patient critical care time: 15 minutes  Additional history critical care time: 6 minutes  Ordering / reviewing  critical care time: 6 minutes  Documentation critical care time: 6 minutes  Consulting other physicians critical care time: 6 minutes  Consult with family critical care time: 6 minutes  Total critical care time (exclusive of procedural time) : 45 minutes  Critical care time was exclusive of separately billable procedures and treating other patients and teaching time.  Critical care was necessary to treat or prevent imminent or life-threatening deterioration of the following conditions: GI bleed.  Critical care was time spent personally by me on the following activities: blood draw for specimens, discussions with consultants, evaluation of patient's response to treatment, obtaining history from patient or surrogate, ordering and review of laboratory studies, pulse oximetry, review of old charts, re-evaluation of patient's condition, ordering and review of radiographic studies, ordering and performing treatments and interventions, examination of patient, interpretation of cardiac output measurements and development of treatment plan with patient or surrogate.        ED Vital Signs:  Vitals:    10/26/18 1850 10/26/18 1857 10/26/18 1859 10/26/18 1903   BP:  119/67  123/75   Pulse: (!) 111 (!) 112 (!) 112 (!) 116   Resp:  18     Temp:       TempSrc:       SpO2:  100%  100%   Weight:  103 kg (227 lb)     Height:        10/26/18 1932 10/26/18 2132 10/26/18 2202 10/26/18 2232   BP: 117/65 115/83 109/76 (!) 148/85   Pulse: (!) 118 (!) 123 (!) 122 (!) 116   Resp:  (!) 30 (!) 24 (!) 33   Temp:       TempSrc:       SpO2: 100% 100%  100%   Weight:       Height:        10/26/18 2302 10/26/18 2340 10/26/18 2346 10/27/18 0003   BP: 130/75 124/70 (!) 112/55 122/62   Pulse: (!) 114 (!) 114 (!) 120 (!) 113   Resp: 16 (!) 30 (!) 25 (!) 35   Temp:   98.8 °F (37.1 °C)    TempSrc:   Oral    SpO2: 100% 100% 100% 100%   Weight:       Height:        10/27/18 0015 10/27/18 0027 10/27/18 0102   BP: 128/83 128/83 117/77   Pulse: (!) 112 (!) 113  (!) 111   Resp: 20 (!) 23    Temp: 98.7 °F (37.1 °C)     TempSrc: Oral     SpO2: 100% 100% 99%   Weight:      Height:          Abnormal Lab Results:  Labs Reviewed   CBC W/ AUTO DIFFERENTIAL - Abnormal; Notable for the following components:       Result Value    WBC 15.30 (*)     RBC 2.19 (*)     Hemoglobin 7.7 (*)     Hematocrit 21.8 (*)      (*)     MCH 35.2 (*)     Gran # (ANC) 11.6 (*)     Mono # 1.8 (*)     Gran% 75.6 (*)     Lymph% 12.2 (*)     All other components within normal limits   COMPREHENSIVE METABOLIC PANEL - Abnormal; Notable for the following components:    CO2 22 (*)     BUN, Bld 30 (*)     Calcium 7.8 (*)     Total Protein 5.0 (*)     Albumin 2.4 (*)     Alkaline Phosphatase 51 (*)     AST 41 (*)     Anion Gap 7 (*)     All other components within normal limits   URINALYSIS, REFLEX TO URINE CULTURE - Abnormal; Notable for the following components:    Occult Blood UA 1+ (*)     All other components within normal limits    Narrative:     Preferred Collection Type->Urine, Clean Catch   LIPASE   TROPONIN I   APTT   PROTIME-INR   PROTIME-INR   APTT   URINALYSIS MICROSCOPIC    Narrative:     Preferred Collection Type->Urine, Clean Catch   TYPE & SCREEN   PREPARE RBC SOFT        All Lab Results:  Results for orders placed or performed during the hospital encounter of 10/26/18   CBC W/ AUTO DIFFERENTIAL   Result Value Ref Range    WBC 15.30 (H) 3.90 - 12.70 K/uL    RBC 2.19 (L) 4.60 - 6.20 M/uL    Hemoglobin 7.7 (L) 14.0 - 18.0 g/dL    Hematocrit 21.8 (L) 40.0 - 54.0 %     (H) 82 - 98 fL    MCH 35.2 (H) 27.0 - 31.0 pg    MCHC 35.3 32.0 - 36.0 g/dL    RDW 13.1 11.5 - 14.5 %    Platelets 217 150 - 350 K/uL    MPV 10.1 9.2 - 12.9 fL    Gran # (ANC) 11.6 (H) 1.8 - 7.7 K/uL    Lymph # 1.9 1.0 - 4.8 K/uL    Mono # 1.8 (H) 0.3 - 1.0 K/uL    Eos # 0.0 0.0 - 0.5 K/uL    Baso # 0.01 0.00 - 0.20 K/uL    Gran% 75.6 (H) 38.0 - 73.0 %    Lymph% 12.2 (L) 18.0 - 48.0 %    Mono% 12.0 4.0 - 15.0 %     Eosinophil% 0.1 0.0 - 8.0 %    Basophil% 0.1 0.0 - 1.9 %    Differential Method Automated    Comp. Metabolic Panel   Result Value Ref Range    Sodium 137 136 - 145 mmol/L    Potassium 3.9 3.5 - 5.1 mmol/L    Chloride 108 95 - 110 mmol/L    CO2 22 (L) 23 - 29 mmol/L    Glucose 110 70 - 110 mg/dL    BUN, Bld 30 (H) 6 - 20 mg/dL    Creatinine 1.0 0.5 - 1.4 mg/dL    Calcium 7.8 (L) 8.7 - 10.5 mg/dL    Total Protein 5.0 (L) 6.0 - 8.4 g/dL    Albumin 2.4 (L) 3.5 - 5.2 g/dL    Total Bilirubin 0.2 0.1 - 1.0 mg/dL    Alkaline Phosphatase 51 (L) 55 - 135 U/L    AST 41 (H) 10 - 40 U/L    ALT 43 10 - 44 U/L    Anion Gap 7 (L) 8 - 16 mmol/L    eGFR if African American >60 >60 mL/min/1.73 m^2    eGFR if non African American >60 >60 mL/min/1.73 m^2   Lipase   Result Value Ref Range    Lipase 37 4 - 60 U/L   Urinalysis, Reflex to Urine Culture Urine, Clean Catch   Result Value Ref Range    Specimen UA Urine, Unspecified     Color, UA Yellow Yellow, Straw, Echo    Appearance, UA Clear Clear    pH, UA 6.0 5.0 - 8.0    Specific Gravity, UA 1.010 1.005 - 1.030    Protein, UA Negative Negative    Glucose, UA Negative Negative    Ketones, UA Negative Negative    Bilirubin (UA) Negative Negative    Occult Blood UA 1+ (A) Negative    Nitrite, UA Negative Negative    Urobilinogen, UA Negative <2.0 EU/dL    Leukocytes, UA Negative Negative   Troponin I   Result Value Ref Range    Troponin I 0.023 0.000 - 0.026 ng/mL   Protime-INR   Result Value Ref Range    Prothrombin Time 10.9 9.0 - 12.5 sec    INR 1.1 0.8 - 1.2   APTT   Result Value Ref Range    aPTT <21.0 21.0 - 32.0 sec   Urinalysis Microscopic   Result Value Ref Range    RBC, UA 2 0 - 4 /hpf    Microscopic Comment SEE COMMENT    Type & Screen   Result Value Ref Range    Group & Rh AB POS     Indirect Lily NEG    Prepare RBC 2 Units; upper GI bleed with Hgb of 7.7   Result Value Ref Range    UNIT NUMBER [id number]     PRODUCT CODE Z2964D44     DISPENSE STATUS CROSSMATCHED      CODING SYSTEM [id number]     Unit Blood Type Code 6200     Unit Blood Type A POS     Unit Expiration [id number]     UNIT NUMBER [id number]     PRODUCT CODE D2038M34     DISPENSE STATUS TRANSFUSED     CODING SYSTEM [id number]     Unit Blood Type Code 6200     Unit Blood Type A POS     Unit Expiration [id number]          Imaging Results:  Imaging Results          X-Ray Chest AP Portable (Final result)  Result time 10/26/18 22:10:34    Final result by Peter Boone MD (10/26/18 22:10:34)                 Impression:      No acute process seen.      Electronically signed by: Peter Boone MD  Date:    10/26/2018  Time:    22:10             Narrative:    EXAMINATION:  XR CHEST AP PORTABLE    CLINICAL HISTORY:  tachycardia and fatigue;    FINDINGS:  Single view of the chest.  Aorta demonstrates atherosclerotic disease.    Cardiac silhouette is normal.  The lungs demonstrate no evidence of active disease.  No evidence of pleural effusion or pneumothorax.  Bones appear intact.                               The EKG was ordered, reviewed, and independently interpreted by the ED provider.  Interpretation time: 2000  Rate: 114 BPM  Rhythm: sinus tachycardia  Interpretation: No acute ST changes. No STEMI.           The Emergency Provider reviewed the vital signs and test results, which are outlined above.    ED Discussion     10:36 PM: Re-evaluated pt. Discussed risks benefits for blood transfusion. Pt signed a written consent for blood transfusion. D/w pt all pertinent results. D/w pt any concerns expressed at this time. Answered all questions. Pt expresses understanding at this time.    10:42 PM: Dr. Card discussed the pt's case with Dr. Chaudhry () who recommends 2 units of blood, protonix injection, protonix infusion, and admission to .    10:44 PM: Discussed case with Johnna Cruz NP (Hospital Medicine). Dr. Nogueira agrees with current care and management of pt and accepts admission.   Admitting Service: [facility]  medicine   Admitting Physician: Dr. Nogueira  Admit to: Med-Tele    10:50 PM: Re-evaluated pt. I have discussed test results, shared treatment plan, and the need for admission with patient and family at bedside. Pt and family express understanding at this time and agree with all information. All questions answered. Pt and family have no further questions or concerns at this time. Pt is ready for admit.      ED Medication(s):  Medications   pantoprazole 40 mg in dextrose 5 % 100 mL infusion (ready to mix system) (8 mg/hr Intravenous New Bag 10/26/18 2300)   0.9%  NaCl infusion (for blood administration) ( Intravenous New Bag 10/27/18 0000)   lorazepam (ATIVAN) injection 1 mg (not administered)   nicotine 14 mg/24 hr 1 patch (not administered)   ondansetron injection 4 mg (4 mg Intravenous Given 10/26/18 2009)   sodium chloride 0.9% bolus 2,000 mL (0 mLs Intravenous Stopped 10/27/18 0000)   pantoprazole injection 80 mg (80 mg Intravenous Given 10/26/18 2255)             Medical Decision Making    Medical Decision Making:   Clinical Tests:   Lab Tests: Ordered and Reviewed  Radiological Study: Ordered and Reviewed  Medical Tests: Ordered and Reviewed           Scribe Attestation:   Scribe #1: I performed the above scribed service and the documentation accurately describes the services I performed. I attest to the accuracy of the note.    Attending:   Physician Attestation Statement for Scribe #1: I, Charlie Card MD, personally performed the services described in this documentation, as scribed by Eri Bowden, in my presence, and it is both accurate and complete.          Clinical Impression       ICD-10-CM ICD-9-CM   1. GI bleed K92.2 578.9       Disposition:   Disposition: Admitted  Condition: Serious         Charlie Card MD  10/27/18 0133

## 2018-10-27 NOTE — TRANSFER OF CARE
"Anesthesia Transfer of Care Note    Patient: Tomas Zuniga Jr.    Procedure(s) Performed: Procedure(s) (LRB):  EGD (ESOPHAGOGASTRODUODENOSCOPY) (N/A)    Patient location: GI    Anesthesia Type: MAC    Post pain: adequate analgesia    Post assessment: no apparent anesthetic complications and tolerated procedure well    Post vital signs: stable    Level of consciousness: awake, alert and oriented    Nausea/Vomiting: no nausea/vomiting    Complications: none    Transfer of care protocol was followed      Last vitals:   Visit Vitals  /69 (BP Location: Left arm, Patient Position: Lying)   Pulse 91   Temp 36.8 °C (98.3 °F) (Oral)   Resp 20   Ht 5' 11" (1.803 m)   Wt 103 kg (227 lb)   SpO2 100%   BMI 31.66 kg/m²     "

## 2018-10-27 NOTE — ANESTHESIA POSTPROCEDURE EVALUATION
"Anesthesia Post Evaluation    Patient: Tomas Zuniga Jr.    Procedure(s) Performed: Procedure(s) (LRB):  EGD (ESOPHAGOGASTRODUODENOSCOPY) (N/A)    Final Anesthesia Type: MAC  Patient location during evaluation: GI PACU  Patient participation: Yes- Able to Participate  Level of consciousness: awake and alert  Post-procedure vital signs: reviewed and stable  Pain management: adequate  Airway patency: patent  PONV status at discharge: No PONV  Anesthetic complications: no      Cardiovascular status: blood pressure returned to baseline  Respiratory status: unassisted  Hydration status: euvolemic  Follow-up not needed.        Visit Vitals  /69 (BP Location: Left arm, Patient Position: Lying)   Pulse 91   Temp 36.8 °C (98.3 °F) (Oral)   Resp 20   Ht 5' 11" (1.803 m)   Wt 103 kg (227 lb)   SpO2 100%   BMI 31.66 kg/m²       Pain/Edward Score: Pain Assessment Performed: Yes (10/27/2018  2:00 PM)  Presence of Pain: denies (10/27/2018  2:00 PM)        "

## 2018-10-27 NOTE — [employer] PATIENT INSTRUCTIONS
Discharge Summary/Instructions after an Endoscopic Procedure  Patient Name: Tomas Zuniga  Patient MRN: [id number]  Patient YOB: 1960 Saturday, October 27, 2018 Lizette Chaudhry MD  RESTRICTIONS:  During your procedure today, you received medications for sedation.  These   medications may affect your judgment, balance and coordination.  Therefore,   for 24 hours, you have the following restrictions:   - DO NOT drive a car, operate machinery, make legal/financial decisions,   sign important papers or drink alcohol.    ACTIVITY:  Today: no heavy lifting, straining or running due to procedural   sedation/anesthesia.  The following day: return to full activity including work.  DIET:  Eat and drink normally unless instructed otherwise.     TREATMENT FOR COMMON SIDE EFFECTS:  - Mild abdominal pain, nausea, belching, bloating or excessive gas:  rest,   eat lightly and use a heating pad.  - Sore Throat: treat with throat lozenges and/or gargle with warm salt   water.  - Because air was used during the procedure, expelling large amounts of air   from your rectum or belching is normal.  - If a bowel prep was taken, you may not have a bowel movement for 1-3 days.    This is normal.  SYMPTOMS TO WATCH FOR AND REPORT TO YOUR PHYSICIAN:  1. Abdominal pain or bloating, other than gas cramps.  2. Chest pain.  3. Back pain.  4. Signs of infection such as: chills or fever occurring within 24 hours   after the procedure.  5. Rectal bleeding, which would show as bright red, maroon, or black stools.   (A tablespoon of blood from the rectum is not serious, especially if   hemorrhoids are present.)  6. Vomiting.  7. Weakness or dizziness.  GO DIRECTLY TO THE NEAREST EMERGENCY ROOM IF YOU HAVE ANY OF THE FOLLOWING:      Difficulty breathing              Chills and/or fever over 101 F   Persistent vomiting and/or vomiting blood   Severe abdominal pain   Severe chest pain   Black, tarry stools   Bleeding- more than one  tablespoon   Any other symptom or condition that you feel may need urgent attention  Your doctor recommends these additional instructions:  If any biopsies were taken, your doctors clinic will contact you in 1 to 2   weeks with any results.  - Patient has a contact number available for emergencies.  The signs and   symptoms of potential delayed complications were discussed with the   patient.  Return to normal activities tomorrow.  Written discharge   instructions were provided to the patient.   - Clear liquid diet.   - Continue present medications. PPI IV for total of 72 hours.  - Continue to hold ASA/NSAIDs.  - Await pathology results.   - Repeat upper endoscopy in 12 weeks to re-eval of esophagus for Baeza's   and recheck for H. pylori if biopsies are positive.   - Return patient to [facility] for ongoing care.  For questions, problems or results please call your physician Lizette Chaudhry MD at Work:  [phone]  If you have any questions about the above instructions, call the GI   department at [phone] or call the endoscopy unit at [phone]   from 7am until 3 pm.  [facility] [place], EMERGENCY ROOM PHONE NUMBER:   [phone]  IF A COMPLICATION OR EMERGENCY SITUATION ARISES AND YOU ARE UNABLE TO REACH   YOUR PHYSICIAN - GO DIRECTLY TO THE EMERGENCY ROOM.  I have read or have had read to me these discharge instructions for my   procedure and have received a written copy.  I understand these   instructions and will follow-up with my physician if I have any questions.     __________________________________       _____________________________________  Nurse Signature                                          Patient/Designated   Responsible Party Signature  Lizette Chaudhry MD  10/27/2018 2:49:50 PM  This report has been verified and signed electronically.  [employer]

## 2018-10-27 NOTE — ED TRIAGE NOTES
Pt to ED via EMS for reports of bloody diarrhea and abd discomfort with BMs, generalized weakness and several falls without injury. Per pt and family pt ran out of his HTN medication (Nifedipine 10mg TID) and Wednesday took a family member's nifedipine 60mg twice and last dose yesterday AM.

## 2018-10-27 NOTE — ANESTHESIA RELEASE NOTE
"Anesthesia Release from PACU Note    Patient: Tomas Zuniga Jr.    Procedure(s) Performed: Procedure(s) (LRB):  EGD (ESOPHAGOGASTRODUODENOSCOPY) (N/A)    Anesthesia type: MAC    Post pain: Adequate analgesia    Post assessment: no apparent anesthetic complications, tolerated procedure well and no evidence of recall    Last Vitals:   Visit Vitals  /69 (BP Location: Left arm, Patient Position: Lying)   Pulse 91   Temp 36.8 °C (98.3 °F) (Oral)   Resp 20   Ht 5' 11" (1.803 m)   Wt 103 kg (227 lb)   SpO2 100%   BMI 31.66 kg/m²       Post vital signs: stable    Level of consciousness: awake    Nausea/Vomiting: no nausea/no vomiting    Complications: none    Airway Patency: patent    Respiratory: unassisted    Cardiovascular: stable and blood pressure at baseline    Hydration: euvolemic  "

## 2018-10-27 NOTE — ANESTHESIA PREPROCEDURE EVALUATION
10/27/2018  Tomas Zuniga Jr. is a 58 y.o., male.    Pre-op Assessment    I have reviewed the Patient Summary Reports.     I have reviewed the Nursing Notes.   I have reviewed the Medications.     Review of Systems  Anesthesia Hx:  No previous Anesthesia  Neg history of prior surgery. Denies Family Hx of Anesthesia complications.   Denies Personal Hx of Anesthesia complications.   Social:  Smoker    Hematology/Oncology:     Oncology Normal   Hematology Comments: S/p 2 units PRBCS this am   Cardiovascular:   Hypertension 12 lead in ER done ST otherwise normal Cardiomyopathy  Hypertension    Renal/:  Renal/ Normal         Physical Exam  General:  Morbid Obesity    Airway/Jaw/Neck:  Airway Findings: Mouth Opening: Normal Tongue: Normal  General Airway Assessment: Adult  Mallampati: III  Improves to III with phonation.  TM Distance: Normal, at least 6 cm      Dental:  Dental Findings: loose teeth         Mental Status:  Mental Status Findings:  Cooperative         Anesthesia Plan  Type of Anesthesia, risks & benefits discussed:  Anesthesia Type:  MAC  Patient's Preference:   Intra-op Monitoring Plan:   Intra-op Monitoring Plan Comments:   Post Op Pain Control Plan:   Post Op Pain Control Plan Comments:   Induction:    Beta Blocker:         Informed Consent: Patient understands risks and agrees with Anesthesia plan.  Questions answered. Anesthesia consent signed with patient.  ASA Score: 3  emergent   Day of Surgery Review of History & Physical: I have interviewed and examined the patient. I have reviewed the patient's H&P dated:  There are no significant changes.

## 2018-10-27 NOTE — ASSESSMENT & PLAN NOTE
He presents with signs/symptoms for UGIB likely caused by ASA/Etoh/tobacco use.  Possible source PUD, Erosive Gastritis/Espohagitis, varcies, etc given his history  -admit and already getting transfusion per ED  -started on PPI infusion  -GI consult

## 2018-10-27 NOTE — HPI
58M admitted with anemia, HB to ~7.7. He reports 3 days of melena, reports stools as black.  He was also noticing increasing NUNO and weakness. He denies any chest pain or abdominal pain. No acute issues at this time. Last melenic stool was last night.  Does take ASA 81mg but no other antiplt or anticaog.

## 2018-10-27 NOTE — DISCHARGE INSTRUCTIONS
Upper GI Endoscopy     During endoscopy, a long, flexible tube is used to view the inside of your upper GI tract.      Upper GI endoscopy allows your healthcare provider to look directly into the beginning of your gastrointestinal (GI) tract. The esophagus, stomach, and duodenum (the first part of the small intestine) make up the upper GI tract.   Before the exam  Follow these and any other instructions you are given before your endoscopy. If you dont follow the healthcare providers instructions carefully, the test may need to be canceled or done over:  · Don't eat or drink anything after midnight the night before your exam. If your exam is in the afternoon, drink only clear liquids in the morning. Don't eat or drink anything for 8 hours before the exam. In some cases, you may be able to take medicines with sips of water until 2 hours before the procedure. Speak with your healthcare provider about this.   · Bring your X-rays and any other test results you have.  · Because you will be sedated, arrange for an adult to drive you home after the exam.  · Tell your healthcare provider before the exam if you are taking any medicines or have any medical problems.  The procedure  Here is what to expect:  · You will lie on the endoscopy table. Usually patients lie on the left side.  · You will be monitored and given oxygen.  · Your throat may be numbed with a spray or gargle. You are given medicine through an intravenous (IV) line that will help you relax and remain comfortable. You may be awake or asleep during the procedure.  · The healthcare provider will put the endoscope in your mouth and down your esophagus. It is thinner than most pieces of food that you swallow. It will not affect your breathing. The medicine helps keep you from gagging.  · Air is put into your GI tract to expand it. It can make you burp.  · During the procedure, the healthcare provider can take biopsies (tissue samples), remove abnormalities,  such as polyps, or treat abnormalities through a variety of devices placed through the endoscope. You will not feel this.   · The endoscope carries images of your upper GI tract to a video screen. If you are awake, you may be able to look at the images.  · After the procedure is done, you will rest for a time. An adult must drive you home.  When to call your healthcare provider  Contact your healthcare provider if you have:  · Black or tarry stools, or blood in your stool  · Fever  · Pain in your belly that does not go away  · Nausea and vomiting, or vomiting blood   Date Last Reviewed: 7/1/2016 © 2000-2017 [employer]. [address] [place] [address], [place] [address]. All rights reserved. This information is not intended as a substitute for professional medical care. Always follow your healthcare professional's instructions.        Colonoscopy     A camera attached to a flexible tube with a viewing lens is used to take video pictures.     Colonoscopy is a test to view the inside of your lower digestive tract (colon and rectum). Sometimes it can show the last part of the small intestine (ileum). During the test, small pieces of tissue may be removed for testing. This is called a biopsy. Small growths, such as polyps, may also be removed.   Why is colonoscopy done?  The test is done to help look for colon cancer. And it can help find the source of abdominal pain, bleeding, and changes in bowel habits. It may be needed once a year, depending on factors such as your:  · Age  · Health history  · Family health history  · Symptoms  · Results from any prior colonoscopy  Risks and possible complications  These include:  · Bleeding               · A puncture or tear in the colon   · Risks of anesthesia  · A cancer lesion not being seen  Getting ready   To prepare for the test:  · Talk with your healthcare provider about the risks of the test (see below). Also ask your healthcare provider about alternatives to the  test.  · Tell your healthcare provider about any medicines you take. Also tell him or her about any health conditions you may have.  · Make sure your rectum and colon are empty for the test. Follow the diet and bowel prep instructions exactly. If you dont, the test may need to be rescheduled.  · Plan for a friend or family member to drive you home after the test.     Colonoscopy provides an inside view of the entire colon.     You may discuss the results with your doctor right away or at a future visit.  During the test   The test is usually done in the hospital on an outpatient basis. This means you go home the same day. The procedure takes about 30 minutes. During that time:  · You are given relaxing (sedating) medicine through an IV line. You may be drowsy, or fully asleep.  · The healthcare provider will first give you a physical exam to check for anal and rectal problems.  · Then the anus is lubricated and the scope inserted.  · If you are awake, you may have a feeling similar to needing to have a bowel movement. You may also feel pressure as air is pumped into the colon. Its OK to pass gas during the procedure.  · Biopsy, polyp removal, or other treatments may be done during the test.  After the test   You may have gas right after the test. It can help to try to pass it to help prevent later bloating. Your healthcare provider may discuss the results with you right away. Or you may need to schedule a follow-up visit to talk about the results. After the test, you can go back to your normal eating and other activities. You may be tired from the sedation and need to rest for a few hours.  Date Last Reviewed: 11/1/2016 © 2000-2017 [employer]. [address] [place] [address]. All rights reserved. This information is not intended as a substitute for professional medical care. Always follow your healthcare professional's instructions.          Understanding Gastric Ulcers    A gastric ulcer  is an open sore in the stomach lining. It is sometimes called a peptic ulcer. This is a more general term for ulcers that may be in the stomach or the upper part of the small intestine. Ulcers can cause pain. But they may also have no symptoms for a long time.  What causes gastric ulcers?  Gastric ulcers have a few common causes. To find the cause of your ulcer, your healthcare provider will give you an exam and take your health history. He or she may also order some tests. The main causes of gastric ulcers include:  · Infection with the H. pylori (Helicobacter pylori) bacteria. This damages the stomach lining. Digestive juices can then harm the digestive tract.  · Long-term use of some over-the-counter pain medicines. This reduces the bodys ability to protect the stomach from damage.  Other causes of gastric ulcers include:  · Heavy alcohol use  · Having a family history of ulcers  · In rare cases, a tumor in the digestive tract may cause an ulcer  Symptoms of a gastric ulcer  Ulcer symptoms may appear and then go away for a time. Symptoms of a gastric ulcer may include:  · Stomach pain, often a dull or burning feeling toward the top of your belly  · Feeling full or bloated  · Heartburn or acid reflux  · Upset stomach (nausea) or vomiting  · Vomiting blood  · Lack of appetite  · Weight loss  · Black stool  · Red blood in the stool  Treatment for a gastric ulcer  Treatment for gastric ulcers may depend on what is causing them. Treatment may include:  · Not using over-the-counter medicines. You will likely need to stop taking these medicines. But in some cases these medicines cant be safely stopped. Check with your healthcare provider to see what is best for you.   · Taking medicines to ease symptoms. These medicines may help to reduce the amount of acid your stomach makes. They also may help coat your stomach lining.  · Taking antibiotics. If your ulcer was caused by H. pylori, your provider will likely prescribe  antibiotics to get rid of the infection.  · Having an endoscopy. This is often done to check the stomach and diagnose the ulcer. In some cases it can also treat the ulcer. It involves passing a flexible tube through your mouth into your stomach and small intestine.  · Using a tube (catheter) to stop the bleeding. A thin tube is passed into one of your blood vessels. Special tools are used to help stop the bleeding.  · Having surgery. You often may need this if the ulcer has caused severe symptoms.  Making some lifestyle changes can reduce ulcer symptoms. It may also prevent more damage to your digestive tract. These changes include:  · Not taking over-the-counter pain medicines. Talk with your provider about using another type of pain reliever.  · Not taking aspirin unless your provider has advised it  · Limiting the amount of alcohol you drink  · Quitting smoking  Possible complications of a gastric ulcer  Gastric ulcers can have serious complications. These can include:  · Bleeding into the stomach  · A hole (perforation) in the stomach  · A blockage that interferes with food moving from your stomach to the small intestine  An ongoing infection with H. pylori may be a risk factor for stomach cancer. This is one reason it is important to get rid of this bacteria.  When to call your healthcare provider  Call your healthcare provider right away if you have any of these:  · Vomiting blood, or vomit that looks like coffee grounds  · Bloody, black, or tarry-looking stools  · Fever of 100.4°F (38°C) or higher, or as directed  · Pain that gets worse  · Symptoms that dont get better with treatment, or symptoms that get worse  · New symptoms   Date Last Reviewed: 5/1/2016  © 3294-7321 [employer]. [address] [place] [address], [place] [address]. All rights reserved. This information is not intended as a substitute for professional medical care. Always follow your healthcare professional's instructions.

## 2018-10-27 NOTE — H&P
[facility] Hospital Medicine  History & Physical    Patient Name: Tomas Zuniga Jr.  MRN: [id number]  Admission Date: 10/26/2018  Attending Physician: Roberta Nogueira MD   Primary Care Provider: ISAI Díaz         Patient information was obtained from patient, spouse/SO and ER records.     Subjective:     Principal Problem:GI bleed    Chief Complaint:   Chief Complaint   Patient presents with    Fatigue     N/V        HPI: Mr. Zuniga is a 57 yo Black male with history of HTN who presents to the Ed c/o dark tarry stools and abdominal discomfort. He contributes his symptoms to taking his daughter's procardia of 60 mg instead of his usual 10 mg. Upon presentation he was found anemic with H/H of 7/21 and BUN 30 with normal Creatinine. He states he has been having some diarrhea of his dark stools. He is a daily smoker of 1 ppd cigarettes and daily alcohol use. He takes a daily baby aspirin but denies any other NSAIDs.    Past Medical History:   Diagnosis Date    Cardiac hypertrophy     Hypertension        History reviewed. No pertinent surgical history.    Review of patient's allergies indicates:  No Known Allergies    No current facility-administered medications on file prior to encounter.      Current Outpatient Medications on File Prior to Encounter   Medication Sig    NIFEdipine (PROCARDIA) 10 MG Cap Take 20 mg by mouth every 8 (eight) hours.     Family History     None        Tobacco Use    Smoking status: Current Every Day Smoker     Packs/day: 1.00     Types: Cigarettes    Smokeless tobacco: Never Used   Substance and Sexual Activity    Alcohol use: Yes     Alcohol/week: 3.6 oz     Types: 6 Cans of beer per week    Drug use: No    Sexual activity: Not on file     Review of Systems   Constitutional: Negative.    HENT: Negative.    Eyes: Negative.    Respiratory: Negative.    Cardiovascular: Negative.    Gastrointestinal: Positive for abdominal pain, blood in stool and  diarrhea.   Endocrine: Negative.    Genitourinary: Negative.    Musculoskeletal: Negative.    Skin: Negative.    Allergic/Immunologic: Negative.    Neurological: Negative.    Hematological: Negative.    Psychiatric/Behavioral: Negative.      Objective:     Vital Signs (Most Recent):  Temp: 98.7 °F (37.1 °C) (10/27/18 0015)  Pulse: (!) 112 (10/27/18 0015)  Resp: 20 (10/27/18 0015)  BP: 128/83 (10/27/18 0015)  SpO2: 100 % (10/27/18 0015) Vital Signs (24h Range):  Temp:  [98.7 °F (37.1 °C)-98.8 °F (37.1 °C)] 98.7 °F (37.1 °C)  Pulse:  [107-123] 112  Resp:  [16-33] 20  SpO2:  [100 %] 100 %  BP: (109-148)/(55-85) 128/83     Weight: 103 kg (227 lb)  Body mass index is 31.66 kg/m².    Physical Exam   Constitutional: He is oriented to person, place, and time. He appears well-developed and well-nourished.   Looks older than his stated age   HENT:   Head: Normocephalic and atraumatic.   Right Ear: External ear normal.   Left Ear: External ear normal.   Nose: Nose normal.   Mouth/Throat: Oropharynx is clear and moist.   Eyes: Conjunctivae and EOM are normal. Pupils are equal, round, and reactive to light.   Neck: Normal range of motion. Neck supple.   Cardiovascular: Normal rate, regular rhythm, normal heart sounds and intact distal pulses.   Pulmonary/Chest: Effort normal and breath sounds normal.   Abdominal: Soft. He exhibits no mass. There is tenderness (epigastric). There is no rebound.   Genitourinary: Rectal exam shows guaiac positive stool.   Musculoskeletal: Normal range of motion.   Neurological: He is alert and oriented to person, place, and time.   Skin: Skin is warm and dry. Capillary refill takes less than 2 seconds.   Psychiatric: He has a normal mood and affect.         CRANIAL NERVES     CN III, IV, VI   Pupils are equal, round, and reactive to light.  Extraocular motions are normal.        Significant Labs:   Recent Lab Results       10/26/18  2250   10/26/18  2201   10/26/18  1850        Unit Blood Type Code    6200[P]          6200[P]       Unit Expiration   [id number][P]          [id number][P]       Unit Blood Type   A POS[P]          A POS[P]       Albumin     2.4     Alkaline Phosphatase     51     ALT     43     Anion Gap     7     Appearance, UA Clear         aPTT     <21.0  Comment:  aPTT therapeutic range = 39-69 seconds     AST     41     Baso #     0.01     Basophil%     0.1     Bilirubin (UA) Negative         Total Bilirubin     0.2  Comment:  For infants and newborns, interpretation of results should be based  on gestational age, weight and in agreement with clinical  observations.  Premature Infant recommended reference ranges:  Up to 24 hours.............<8.0 mg/dL  Up to 48 hours............<12.0 mg/dL  3-5 days..................<15.0 mg/dL  6-29 days.................<15.0 mg/dL       BUN, Bld     30     Calcium     7.8     Chloride     108     CO2     22     CODING SYSTEM   HEBT194[P]          [id number][P]       Color, UA Yellow         Creatinine     1.0     Differential Method     Automated     DISPENSE STATUS   CROSSMATCHED[P]          ISSUED[P]       eGFR if      >60     eGFR if non      >60  Comment:  Calculation used to obtain the estimated glomerular filtration  rate (eGFR) is the CKD-EPI equation.        Eos #     0.0     Eosinophil%     0.1     Glucose     110     Glucose, UA Negative         Gran # (ANC)     11.6     Gran%     75.6     Group & Rh   AB POS       Hematocrit     21.8     Hemoglobin     7.7     INDIRECT ANDREA   NEG       Coumadin Monitoring INR     1.1  Comment:  Coumadin Therapy:  2.0 - 3.0 for INR for all indicators except mechanical heart valves  and antiphospholipid syndromes which should use 2.5 - 3.5.       Ketones, UA Negative         Leukocytes, UA Negative         Lipase     37     Lymph #     1.9     Lymph%     12.2     MCH     35.2     MCHC     35.3     MCV     100     Microscopic Comment SEE COMMENT  Comment:  Other formed elements not  mentioned in the report are not   present in the microscopic examination.            Mono #     1.8     Mono%     12.0     MPV     10.1     Nitrite, UA Negative         Occult Blood UA 1+         pH, UA 6.0         Platelets     217     Potassium     3.9     PRODUCT CODE   H0820F58[P]          [id number][P]       Total Protein     5.0     Protein, UA Negative  Comment:  Recommend a 24 hour urine protein or a urine   protein/creatinine ratio if globulin induced proteinuria is  clinically suspected.           Protime     10.9     RBC     2.19     RBC, UA 2         RDW     13.1     Sodium     137     Specific Mona, UA 1.010         Specimen UA Urine, Unspecified         Troponin I     0.023  Comment:  The reference interval for Troponin I represents the 99th percentile   cutoff   for our facility and is consistent with 3rd generation assay   performance.       UNIT NUMBER   [id number][P]          [id number][P]       Urobilinogen, UA Negative         WBC     15.30           Significant Imaging:   Imaging Results          X-Ray Chest AP Portable (Final result)  Result time 10/26/18 22:10:34    Final result by Peter Boone MD (10/26/18 22:10:34)                 Impression:      No acute process seen.      Electronically signed by: Peter Boone MD  Date:    10/26/2018  Time:    22:10             Narrative:    EXAMINATION:  XR CHEST AP PORTABLE    CLINICAL HISTORY:  tachycardia and fatigue;    FINDINGS:  Single view of the chest.  Aorta demonstrates atherosclerotic disease.    Cardiac silhouette is normal.  The lungs demonstrate no evidence of active disease.  No evidence of pleural effusion or pneumothorax.  Bones appear intact.                                  Assessment/Plan:     * GI bleed    He presents with signs/symptoms for UGIB likely caused by ASA/Etoh/tobacco use.  Possible source PUD, Erosive Gastritis/Espohagitis, varcies, etc given his history  -admit and already getting transfusion per  ED  -started on PPI infusion  -GI consult       Hypertension    Chronic will monitor and PRN IV medications       Leukocytosis    Likely reactive from his UGIB doubt infection cause   correct anemia and monitor       Tobacco use    Will place nicotine patch  Counseled on cessation       Alcohol use    Daily beer drinker  Monitor for withdrawals         VTE Risk Mitigation (From admission, onward)    None             Roberta Nogueira MD  Department of Hospital Medicine   [facility]

## 2018-10-27 NOTE — HPI
Mr. Zuniga is a 59 yo Black male with history of HTN who presents to the Ed c/o dark tarry stools and abdominal discomfort. He contributes his symptoms to taking his daughter's procardia of 60 mg instead of his usual 10 mg. Upon presentation he was found anemic with H/H of 7/21 and BUN 30 with normal Creatinine. He states he has been having some diarrhea of his dark stools. He is a daily smoker of 1 ppd cigarettes and daily alcohol use. He takes a daily baby aspirin but denies any other NSAIDs.

## 2018-10-28 LAB
ALBUMIN SERPL BCP-MCNC: 2.3 G/DL
ANION GAP SERPL CALC-SCNC: 6 MMOL/L
BASOPHILS # BLD AUTO: 0.02 K/UL
BASOPHILS NFR BLD: 0.1 %
BUN SERPL-MCNC: 15 MG/DL
CALCIUM SERPL-MCNC: 8.3 MG/DL
CHLORIDE SERPL-SCNC: 111 MMOL/L
CO2 SERPL-SCNC: 24 MMOL/L
CREAT SERPL-MCNC: 0.8 MG/DL
DIFFERENTIAL METHOD: ABNORMAL
EOSINOPHIL # BLD AUTO: 0.2 K/UL
EOSINOPHIL NFR BLD: 1.2 %
ERYTHROCYTE [DISTWIDTH] IN BLOOD BY AUTOMATED COUNT: 16.5 %
EST. GFR  (AFRICAN AMERICAN): >60 ML/MIN/1.73 M^2
EST. GFR  (NON AFRICAN AMERICAN): >60 ML/MIN/1.73 M^2
GLUCOSE SERPL-MCNC: 90 MG/DL
HCT VFR BLD AUTO: 25.9 %
HGB BLD-MCNC: 8.9 G/DL
HGB BLD-MCNC: 9.1 G/DL
LYMPHOCYTES # BLD AUTO: 3.2 K/UL
LYMPHOCYTES NFR BLD: 20.9 %
MCH RBC QN AUTO: 32.2 PG
MCHC RBC AUTO-ENTMCNC: 34.4 G/DL
MCV RBC AUTO: 94 FL
MONOCYTES # BLD AUTO: 1.6 K/UL
MONOCYTES NFR BLD: 10.2 %
NEUTROPHILS # BLD AUTO: 10.2 K/UL
NEUTROPHILS NFR BLD: 67.6 %
PHOSPHATE SERPL-MCNC: 3.1 MG/DL
PLATELET # BLD AUTO: 185 K/UL
PMV BLD AUTO: 9.6 FL
POTASSIUM SERPL-SCNC: 3.8 MMOL/L
RBC # BLD AUTO: 2.76 M/UL
SODIUM SERPL-SCNC: 141 MMOL/L
WBC # BLD AUTO: 15.15 K/UL

## 2018-10-28 PROCEDURE — 85025 COMPLETE CBC W/AUTO DIFF WBC: CPT

## 2018-10-28 PROCEDURE — 85018 HEMOGLOBIN: CPT

## 2018-10-28 PROCEDURE — C9113 INJ PANTOPRAZOLE SODIUM, VIA: HCPCS | Performed by: INTERNAL MEDICINE

## 2018-10-28 PROCEDURE — S4991 NICOTINE PATCH NONLEGEND: HCPCS | Performed by: FAMILY MEDICINE

## 2018-10-28 PROCEDURE — 80069 RENAL FUNCTION PANEL: CPT

## 2018-10-28 PROCEDURE — 36415 COLL VENOUS BLD VENIPUNCTURE: CPT

## 2018-10-28 PROCEDURE — 21400001 HC TELEMETRY ROOM

## 2018-10-28 PROCEDURE — 25000003 PHARM REV CODE 250: Performed by: FAMILY MEDICINE

## 2018-10-28 PROCEDURE — 63600175 PHARM REV CODE 636 W HCPCS: Performed by: INTERNAL MEDICINE

## 2018-10-28 PROCEDURE — 94761 N-INVAS EAR/PLS OXIMETRY MLT: CPT

## 2018-10-28 PROCEDURE — 99231 SBSQ HOSP IP/OBS SF/LOW 25: CPT | Mod: ,,, | Performed by: INTERNAL MEDICINE

## 2018-10-28 RX ORDER — NIFEDIPINE 20 MG/1
20 CAPSULE ORAL EVERY 8 HOURS
Status: DISCONTINUED | OUTPATIENT
Start: 2018-10-29 | End: 2018-10-29 | Stop reason: HOSPADM

## 2018-10-28 RX ORDER — PANTOPRAZOLE SODIUM 40 MG/10ML
40 INJECTION, POWDER, LYOPHILIZED, FOR SOLUTION INTRAVENOUS 2 TIMES DAILY
Status: DISCONTINUED | OUTPATIENT
Start: 2018-10-28 | End: 2018-10-29 | Stop reason: HOSPADM

## 2018-10-28 RX ADMIN — Medication 1 PATCH: at 08:10

## 2018-10-28 RX ADMIN — PANTOPRAZOLE SODIUM 40 MG: 40 INJECTION, POWDER, FOR SOLUTION INTRAVENOUS at 09:10

## 2018-10-28 RX ADMIN — PANTOPRAZOLE SODIUM 40 MG: 40 INJECTION, POWDER, FOR SOLUTION INTRAVENOUS at 12:10

## 2018-10-28 NOTE — SUBJECTIVE & OBJECTIVE
Interval History:  Upper Endoscopy 27 October revealed an actively bleeding duodenal ulcer.  Injected epinephrine to control bleeding.  Evening post-procedure Hgb decreased to 7.8, transfused 1 unit PRBC.  Hgb 8.9 the following morning and symptoms rimproved.  Continuing IV Pantoprazole and tolerating CLD - Advancing diet.  Denies nausea or vomiting.      Review of Systems   Constitutional: Negative.  Negative for chills and fever.   HENT: Negative.  Negative for congestion and sore throat.    Eyes: Negative.  Negative for visual disturbance.   Respiratory: Negative.  Negative for cough, shortness of breath and wheezing.    Cardiovascular: Negative.  Negative for chest pain.   Gastrointestinal: Positive for blood in stool. Negative for abdominal pain, diarrhea, nausea and vomiting.   Endocrine: Negative.    Genitourinary: Negative.    Musculoskeletal: Negative.  Negative for myalgias and neck stiffness.   Skin: Negative.  Negative for color change and pallor.   Allergic/Immunologic: Negative.    Neurological: Negative.    Hematological: Negative.    Psychiatric/Behavioral: Negative.    All other systems reviewed and are negative.    Objective:     Vital Signs (Most Recent):  Temp: 97.8 °F (36.6 °C) (10/28/18 0739)  Pulse: 95 (10/28/18 0739)  Resp: 18 (10/28/18 0739)  BP: (!) 104/57 (10/28/18 0739)  SpO2: 96 % (10/28/18 0739) Vital Signs (24h Range):  Temp:  [97.8 °F (36.6 °C)-99.5 °F (37.5 °C)] 97.8 °F (36.6 °C)  Pulse:  [] 95  Resp:  [16-39] 18  SpO2:  [96 %-100 %] 96 %  BP: (104-141)/(57-84) 104/57     Weight: 103 kg (227 lb)  Body mass index is 31.66 kg/m².    Intake/Output Summary (Last 24 hours) at 10/28/2018 0923  Last data filed at 10/28/2018 0355  Gross per 24 hour   Intake 3126.26 ml   Output 400 ml   Net 2726.26 ml      Physical Exam   Constitutional: He is oriented to person, place, and time. He appears well-developed and well-nourished. No distress.   HENT:   Head: Normocephalic and atraumatic.    Mouth/Throat: Oropharynx is clear and moist.   Eyes: Conjunctivae and EOM are normal. Pupils are equal, round, and reactive to light.   Neck: No JVD present. No thyromegaly present.   Cardiovascular: Normal rate, regular rhythm and normal heart sounds. Exam reveals no gallop and no friction rub.   No murmur heard.  Pulmonary/Chest: Effort normal and breath sounds normal. No respiratory distress. He has no wheezes. He has no rales.   Abdominal: Soft. Bowel sounds are normal. He exhibits no distension. There is no tenderness. There is no rebound and no guarding.   Musculoskeletal: Normal range of motion. He exhibits no edema or tenderness.   Lymphadenopathy:     He has no cervical adenopathy.   Neurological: He is alert and oriented to person, place, and time. He has normal reflexes. He displays normal reflexes. No cranial nerve deficit.   Skin: Skin is warm and dry. No rash noted. He is not diaphoretic. No erythema.   Psychiatric: He has a normal mood and affect. His behavior is normal. Judgment and thought content normal.       Significant Labs: All pertinent labs within the past 24 hours have been reviewed.    Significant Imaging: I have reviewed all pertinent imaging results/findings within the past 24 hours.

## 2018-10-28 NOTE — PLAN OF CARE
Problem: Patient Care Overview  Goal: Individualization & Mutuality  Outcome: Ongoing (interventions implemented as appropriate)  Patient transferred to telemetry floor. Patient denies pain. Patient oriented times 4. Family at bedside. Night nurse to administer 1 unit of blood.

## 2018-10-28 NOTE — PLAN OF CARE
CM met with patient and wife at bedside to assess discharge needs. Patient lives at home with wife and is independent with needs. Patient ambulates safely independently and denies any recent falls. Patient states pharmacy didn't have Procardia 10mg in stock so he took his daughter's Procardia 60mg, without realizing difference in strength. CM counseled on importance of medication compliance and risk/dangers of taking other's medications. Patient agrees and verbalized understanding. Appointment with PCP Wed, 01/31/18 @ 11am.  Patient admits to smoking cigarettes 1 ppd and drinking beer daily. CM educated on dangers of smoking and effects of alcohol abuse on health. Patient states he is interested in smoking cessation. CM educated on smoking cessation program and provided enrollment information. CM provided a transitional care folder, information on advanced directives, information on pharmacy bedside delivery, and discharge planning begins on admission with contact information for any needs/questions.    DC Plan: Home/selfcare.   Patient agrees with plan.    PCP: ISAI Crowell @ [facility]  Appointment Wed, 10/31/18 @ 11:00am.        10/28/18 1156   Discharge Assessment   Assessment Type Discharge Planning Assessment   Confirmed/corrected address and phone number on facesheet? Yes   Assessment information obtained from? Patient   Prior to hospitilization cognitive status: Alert/Oriented   Prior to hospitalization functional status: Independent   Current cognitive status: Alert/Oriented   Current Functional Status: Independent   Lives With spouse   Able to Return to Prior Arrangements yes   Is patient able to care for self after discharge? Yes   Patient's perception of discharge disposition home or selfcare   Readmission Within The Last 30 Days no previous admission in last 30 days   Patient currently being followed by outpatient case management? No   Patient currently receives any other outside agency  services? No   Equipment Currently Used at Home none   Do you have any problems affording any of your prescribed medications? No   Is the patient taking medications as prescribed? no  (States pharmacy didn't have his Procardia 10mg in stock and he was out so he took daughter's Procardia 60mg. )   If no, which medications is patient not taking? Procardia   Does the patient receive services at the [facility]? No   Discharge Plan A Home   Patient/Family In Agreement With Plan yes

## 2018-10-28 NOTE — SUBJECTIVE & OBJECTIVE
Subjective:     Interval History: No BM or melena. Feels well w/o acute issues. Hb stable.    Review of Systems   Constitutional: Negative.    HENT: Negative.    Eyes: Negative.    Respiratory: Negative.    Cardiovascular: Negative.    Gastrointestinal: Negative.    Genitourinary: Negative.    Musculoskeletal: Negative.    Skin: Negative.    Neurological: Negative.    Psychiatric/Behavioral: Negative.      Objective:     Vital Signs (Most Recent):  Temp: 97.8 °F (36.6 °C) (10/28/18 0739)  Pulse: 95 (10/28/18 0739)  Resp: 18 (10/28/18 0739)  BP: (!) 104/57 (10/28/18 0739)  SpO2: 100 % (10/28/18 0900) Vital Signs (24h Range):  Temp:  [97.8 °F (36.6 °C)-99.5 °F (37.5 °C)] 97.8 °F (36.6 °C)  Pulse:  [] 95  Resp:  [16-39] 18  SpO2:  [96 %-100 %] 100 %  BP: (104-141)/(57-84) 104/57     Weight: 103 kg (227 lb) (10/27/18 1356)  Body mass index is 31.66 kg/m².      Intake/Output Summary (Last 24 hours) at 10/28/2018 1010  Last data filed at 10/28/2018 0355  Gross per 24 hour   Intake 3126.26 ml   Output 400 ml   Net 2726.26 ml       Lines/Drains/Airways     Peripheral Intravenous Line                 Peripheral IV - Single Lumen 10/26/18 1821 Left Antecubital 1 day         Peripheral IV - Single Lumen 10/26/18 2329 Right Antecubital 1 day                Physical Exam   Constitutional: He is oriented to person, place, and time. He appears well-developed and well-nourished. No distress.   HENT:   Head: Normocephalic and atraumatic.   Mouth/Throat: Oropharynx is clear and moist. No oropharyngeal exudate.   Eyes: Conjunctivae are normal. Pupils are equal, round, and reactive to light. Right eye exhibits no discharge. Left eye exhibits no discharge. No scleral icterus.   Pulmonary/Chest: Effort normal and breath sounds normal. No respiratory distress. He has no wheezes.   Abdominal: Soft. He exhibits no distension. There is no tenderness.   Musculoskeletal: He exhibits no edema.   Neurological: He is alert and oriented to  person, place, and time.   Psychiatric: He has a normal mood and affect. His behavior is normal.   Vitals reviewed.      Significant Labs:  CBC:   Recent Labs   Lab 10/27/18  0410 10/27/18  1000 10/27/18  1453 10/28/18  0014 10/28/18  0344   WBC 14.40* 13.24*  --   --  15.15*   HGB 8.4* 8.1* 7.8* 9.1* 8.9*   HCT 24.5* 22.9*  --   --  25.9*    161  --   --  185     CMP:   Recent Labs   Lab 10/26/18  1850 10/28/18  0344    90   CALCIUM 7.8* 8.3*   ALBUMIN 2.4* 2.3*   PROT 5.0*  --     141   K 3.9 3.8   CO2 22* 24    111*   BUN 30* 15   CREATININE 1.0 0.8   ALKPHOS 51*  --    ALT 43  --    AST 41*  --    BILITOT 0.2  --          Significant Imaging:  n/a

## 2018-10-28 NOTE — ASSESSMENT & PLAN NOTE
Hb stable no evidence of additional bleeding. Given high risk ulcer with active bleeding need to continue on IV PPI  -Continue IV PPI  -Monitoring Hb can be decreased to bid  -Likely discharge tomorrow morning with PCP f/u  -Outpatient EGD and colonoscopy already scheduled

## 2018-10-28 NOTE — ASSESSMENT & PLAN NOTE
He presents with signs/symptoms for UGIB likely caused by ASA/EtOH/tobacco use.  Suspect PUD, Erosive Gastritis/Espohagitis, varcies, etc given his history.  Admitted and already received 2 units PRBC transfusion per ED.  Started on Pantoprazole.  GI evaluated and performed endoscopy 27 October.  Found actively bleeding duodenal ulcer - Injected epinephrine.  Continue IV Pantoprazole.  Abstain from ASA, NSAID, and EtOH.

## 2018-10-28 NOTE — PROGRESS NOTES
[facility] Hospital Medicine  Progress Note    Patient Name: Tomas Zuniga Jr.  MRN: [id number]  Patient Class: IP- Inpatient   Admission Date: 10/26/2018  Length of Stay: 2 days  Attending Physician: Aleksey Amaro MD  Primary Care Provider: ISAI Díaz        Subjective:     Principal Problem:Acute duodenal ulcer with bleeding    HPI:  Mr. Zuniga is a 57 yo Black male with history of HTN who presents to the Ed c/o dark tarry stools and abdominal discomfort. He contributes his symptoms to taking his daughter's procardia of 60 mg instead of his usual 10 mg. Upon presentation he was found anemic with H/H of 7/21 and BUN 30 with normal Creatinine. He states he has been having some diarrhea of his dark stools. He is a daily smoker of 1 ppd cigarettes and daily alcohol use. He takes a daily baby aspirin but denies any other NSAIDs.    Hospital Course:  Admitted for evaluation and treatment of GI bleeding.  Started IV Pantoprazole and held any anti-coagulation.  Evaluation by Gastroenterology and expect endoscopy.  Anemic on admission with Hgb 7.7 and received 2 units PRBC.  Hgb level increased to 8.4 and he remained hemodynamically stable.  Upper Endoscopy 27 October revealed an actively bleeding duodenal ulcer.  Injected epinephrine to control bleeding.  Evening post-procedure Hgb decreased to 7.8, transfused 1 unit PRBC.  Hgb 8.9 the following morning and symptoms resolved.  Continuing IV Pantoprazole and advancing diet.    Interval History:  Upper Endoscopy 27 October revealed an actively bleeding duodenal ulcer.  Injected epinephrine to control bleeding.  Evening post-procedure Hgb decreased to 7.8, transfused 1 unit PRBC.  Hgb 8.9 the following morning and symptoms rimproved.  Continuing IV Pantoprazole and tolerating CLD - Advancing diet.  Denies nausea or vomiting.      Review of Systems   Constitutional: Negative.  Negative for chills and fever.   HENT: Negative.  Negative  for congestion and sore throat.    Eyes: Negative.  Negative for visual disturbance.   Respiratory: Negative.  Negative for cough, shortness of breath and wheezing.    Cardiovascular: Negative.  Negative for chest pain.   Gastrointestinal: Positive for blood in stool. Negative for abdominal pain, diarrhea, nausea and vomiting.   Endocrine: Negative.    Genitourinary: Negative.    Musculoskeletal: Negative.  Negative for myalgias and neck stiffness.   Skin: Negative.  Negative for color change and pallor.   Allergic/Immunologic: Negative.    Neurological: Negative.    Hematological: Negative.    Psychiatric/Behavioral: Negative.    All other systems reviewed and are negative.    Objective:     Vital Signs (Most Recent):  Temp: 97.8 °F (36.6 °C) (10/28/18 0739)  Pulse: 95 (10/28/18 0739)  Resp: 18 (10/28/18 0739)  BP: (!) 104/57 (10/28/18 0739)  SpO2: 96 % (10/28/18 0739) Vital Signs (24h Range):  Temp:  [97.8 °F (36.6 °C)-99.5 °F (37.5 °C)] 97.8 °F (36.6 °C)  Pulse:  [] 95  Resp:  [16-39] 18  SpO2:  [96 %-100 %] 96 %  BP: (104-141)/(57-84) 104/57     Weight: 103 kg (227 lb)  Body mass index is 31.66 kg/m².    Intake/Output Summary (Last 24 hours) at 10/28/2018 0923  Last data filed at 10/28/2018 0355  Gross per 24 hour   Intake 3126.26 ml   Output 400 ml   Net 2726.26 ml      Physical Exam   Constitutional: He is oriented to person, place, and time. He appears well-developed and well-nourished. No distress.   HENT:   Head: Normocephalic and atraumatic.   Mouth/Throat: Oropharynx is clear and moist.   Eyes: Conjunctivae and EOM are normal. Pupils are equal, round, and reactive to light.   Neck: No JVD present. No thyromegaly present.   Cardiovascular: Normal rate, regular rhythm and normal heart sounds. Exam reveals no gallop and no friction rub.   No murmur heard.  Pulmonary/Chest: Effort normal and breath sounds normal. No respiratory distress. He has no wheezes. He has no rales.   Abdominal: Soft. Bowel  sounds are normal. He exhibits no distension. There is no tenderness. There is no rebound and no guarding.   Musculoskeletal: Normal range of motion. He exhibits no edema or tenderness.   Lymphadenopathy:     He has no cervical adenopathy.   Neurological: He is alert and oriented to person, place, and time. He has normal reflexes. He displays normal reflexes. No cranial nerve deficit.   Skin: Skin is warm and dry. No rash noted. He is not diaphoretic. No erythema.   Psychiatric: He has a normal mood and affect. His behavior is normal. Judgment and thought content normal.       Significant Labs: All pertinent labs within the past 24 hours have been reviewed.    Significant Imaging: I have reviewed all pertinent imaging results/findings within the past 24 hours.    Assessment/Plan:      * Acute duodenal ulcer with bleeding    He presents with signs/symptoms for UGIB likely caused by ASA/EtOH/tobacco use.  Suspect PUD, Erosive Gastritis/Espohagitis, varcies, etc given his history.  Admitted and already received 2 units PRBC transfusion per ED.  Started on Pantoprazole.  GI evaluated and performed endoscopy 27 October.  Found actively bleeding duodenal ulcer - Injected epinephrine.  Continue IV Pantoprazole.  Abstain from ASA, NSAID, and EtOH.     Leukocytosis    Reactive from his UGIB doubt infection cause.  Correct anemia and monitor.       Alcohol use    Daily beer drinker.  Monitor for withdrawals but he denies ever having symptomatic withdrawal.  Counseled on abstinence in light of ulcer.     Tobacco use    Will place nicotine patch.  Counseled on cessation.     Hypertension    Chronic will monitor and PRN IV medications.       VTE Risk Mitigation (From admission, onward)        Ordered     IP VTE HIGH RISK PATIENT  Once      10/27/18 1710     Place KIRBY hose  Until discontinued      10/27/18 1710     Place sequential compression device  Until discontinued      10/27/18 1710     Reason for No Pharmacological VTE  Prophylaxis  Once      10/27/18 1359     Place sequential compression device  Until discontinued      10/27/18 0039              Aleksey Amaro MD  Department of Hospital Medicine   [facility]

## 2018-10-28 NOTE — PROGRESS NOTES
[facility]  Gastroenterology  Progress Note    Patient Name: Tomas Zuniga Jr.  MRN: [id number]  Admission Date: 10/26/2018  Hospital Length of Stay: 2 days  Code Status: Full Code   Attending Provider: Aleksey Amaro MD  Consulting Provider: Lizette Chaudhry MD  Primary Care Physician: ISAI Díaz  Principal Problem: Acute duodenal ulcer with bleeding      Subjective:     Interval History: No BM or melena. Feels well w/o acute issues. Hb stable.    Review of Systems   Constitutional: Negative.    HENT: Negative.    Eyes: Negative.    Respiratory: Negative.    Cardiovascular: Negative.    Gastrointestinal: Negative.    Genitourinary: Negative.    Musculoskeletal: Negative.    Skin: Negative.    Neurological: Negative.    Psychiatric/Behavioral: Negative.      Objective:     Vital Signs (Most Recent):  Temp: 97.8 °F (36.6 °C) (10/28/18 0739)  Pulse: 95 (10/28/18 0739)  Resp: 18 (10/28/18 0739)  BP: (!) 104/57 (10/28/18 0739)  SpO2: 100 % (10/28/18 0900) Vital Signs (24h Range):  Temp:  [97.8 °F (36.6 °C)-99.5 °F (37.5 °C)] 97.8 °F (36.6 °C)  Pulse:  [] 95  Resp:  [16-39] 18  SpO2:  [96 %-100 %] 100 %  BP: (104-141)/(57-84) 104/57     Weight: 103 kg (227 lb) (10/27/18 1356)  Body mass index is 31.66 kg/m².      Intake/Output Summary (Last 24 hours) at 10/28/2018 1010  Last data filed at 10/28/2018 0355  Gross per 24 hour   Intake 3126.26 ml   Output 400 ml   Net 2726.26 ml       Lines/Drains/Airways     Peripheral Intravenous Line                 Peripheral IV - Single Lumen 10/26/18 1821 Left Antecubital 1 day         Peripheral IV - Single Lumen 10/26/18 2329 Right Antecubital 1 day                Physical Exam   Constitutional: He is oriented to person, place, and time. He appears well-developed and well-nourished. No distress.   HENT:   Head: Normocephalic and atraumatic.   Mouth/Throat: Oropharynx is clear and moist. No oropharyngeal exudate.   Eyes: Conjunctivae are normal.  Pupils are equal, round, and reactive to light. Right eye exhibits no discharge. Left eye exhibits no discharge. No scleral icterus.   Pulmonary/Chest: Effort normal and breath sounds normal. No respiratory distress. He has no wheezes.   Abdominal: Soft. He exhibits no distension. There is no tenderness.   Musculoskeletal: He exhibits no edema.   Neurological: He is alert and oriented to person, place, and time.   Psychiatric: He has a normal mood and affect. His behavior is normal.   Vitals reviewed.      Significant Labs:  CBC:   Recent Labs   Lab 10/27/18  0410 10/27/18  1000 10/27/18  1453 10/28/18  0014 10/28/18  0344   WBC 14.40* 13.24*  --   --  15.15*   HGB 8.4* 8.1* 7.8* 9.1* 8.9*   HCT 24.5* 22.9*  --   --  25.9*    161  --   --  185     CMP:   Recent Labs   Lab 10/26/18  1850 10/28/18  0344    90   CALCIUM 7.8* 8.3*   ALBUMIN 2.4* 2.3*   PROT 5.0*  --     141   K 3.9 3.8   CO2 22* 24    111*   BUN 30* 15   CREATININE 1.0 0.8   ALKPHOS 51*  --    ALT 43  --    AST 41*  --    BILITOT 0.2  --          Significant Imaging:  n/a    Assessment/Plan:     * Acute duodenal ulcer with bleeding    Hb stable no evidence of additional bleeding. Given high risk ulcer with active bleeding need to continue on IV PPI  -Continue IV PPI  -Monitoring Hb can be decreased to bid  -Likely discharge tomorrow morning with PCP f/u  -Outpatient EGD and colonoscopy already scheduled         Thank you for your consult. I will follow-up with patient. Please contact us if you have any additional questions.    Lizette Chaudhry MD  Gastroenterology  [facility]

## 2018-10-28 NOTE — ASSESSMENT & PLAN NOTE
Daily beer drinker.  Monitor for withdrawals but he denies ever having symptomatic withdrawal.  Counseled on abstinence in light of ulcer.

## 2018-10-29 VITALS
BODY MASS INDEX: 31.78 KG/M2 | TEMPERATURE: 99 F | WEIGHT: 227 LBS | HEART RATE: 73 BPM | RESPIRATION RATE: 18 BRPM | SYSTOLIC BLOOD PRESSURE: 109 MMHG | DIASTOLIC BLOOD PRESSURE: 52 MMHG | HEIGHT: 71 IN | OXYGEN SATURATION: 99 %

## 2018-10-29 LAB
ALBUMIN SERPL BCP-MCNC: 2.2 G/DL
ANION GAP SERPL CALC-SCNC: 4 MMOL/L
BASOPHILS # BLD AUTO: 0.01 K/UL
BASOPHILS NFR BLD: 0.1 %
BUN SERPL-MCNC: 13 MG/DL
CALCIUM SERPL-MCNC: 8.1 MG/DL
CHLORIDE SERPL-SCNC: 110 MMOL/L
CO2 SERPL-SCNC: 25 MMOL/L
CREAT SERPL-MCNC: 0.9 MG/DL
DIFFERENTIAL METHOD: ABNORMAL
EOSINOPHIL # BLD AUTO: 0.2 K/UL
EOSINOPHIL NFR BLD: 1.6 %
ERYTHROCYTE [DISTWIDTH] IN BLOOD BY AUTOMATED COUNT: 16.2 %
EST. GFR  (AFRICAN AMERICAN): >60 ML/MIN/1.73 M^2
EST. GFR  (NON AFRICAN AMERICAN): >60 ML/MIN/1.73 M^2
GLUCOSE SERPL-MCNC: 97 MG/DL
HCT VFR BLD AUTO: 23.3 %
HGB BLD-MCNC: 8 G/DL
LYMPHOCYTES # BLD AUTO: 2.6 K/UL
LYMPHOCYTES NFR BLD: 21.9 %
MCH RBC QN AUTO: 32.8 PG
MCHC RBC AUTO-ENTMCNC: 34.3 G/DL
MCV RBC AUTO: 96 FL
MONOCYTES # BLD AUTO: 1.4 K/UL
MONOCYTES NFR BLD: 11.7 %
NEUTROPHILS # BLD AUTO: 7.6 K/UL
NEUTROPHILS NFR BLD: 64.7 %
PHOSPHATE SERPL-MCNC: 3.3 MG/DL
PLATELET # BLD AUTO: 192 K/UL
PMV BLD AUTO: 9.1 FL
POTASSIUM SERPL-SCNC: 3.3 MMOL/L
RBC # BLD AUTO: 2.44 M/UL
SODIUM SERPL-SCNC: 139 MMOL/L
WBC # BLD AUTO: 11.7 K/UL

## 2018-10-29 PROCEDURE — 94760 N-INVAS EAR/PLS OXIMETRY 1: CPT

## 2018-10-29 PROCEDURE — 63600175 PHARM REV CODE 636 W HCPCS: Performed by: INTERNAL MEDICINE

## 2018-10-29 PROCEDURE — 36415 COLL VENOUS BLD VENIPUNCTURE: CPT

## 2018-10-29 PROCEDURE — 25000003 PHARM REV CODE 250: Performed by: INTERNAL MEDICINE

## 2018-10-29 PROCEDURE — S4991 NICOTINE PATCH NONLEGEND: HCPCS | Performed by: FAMILY MEDICINE

## 2018-10-29 PROCEDURE — 80069 RENAL FUNCTION PANEL: CPT

## 2018-10-29 PROCEDURE — 85025 COMPLETE CBC W/AUTO DIFF WBC: CPT

## 2018-10-29 PROCEDURE — C9113 INJ PANTOPRAZOLE SODIUM, VIA: HCPCS | Performed by: INTERNAL MEDICINE

## 2018-10-29 PROCEDURE — 25000003 PHARM REV CODE 250: Performed by: FAMILY MEDICINE

## 2018-10-29 RX ORDER — PANTOPRAZOLE SODIUM 40 MG/1
40 TABLET, DELAYED RELEASE ORAL DAILY
Qty: 30 TABLET | Refills: 2 | Status: SHIPPED | OUTPATIENT
Start: 2018-10-29 | End: 2018-11-01 | Stop reason: SDUPTHER

## 2018-10-29 RX ADMIN — Medication 1 PATCH: at 08:10

## 2018-10-29 RX ADMIN — NIFEDIPINE 20 MG: 10 CAPSULE, LIQUID FILLED ORAL at 05:10

## 2018-10-29 RX ADMIN — PANTOPRAZOLE SODIUM 40 MG: 40 INJECTION, POWDER, FOR SOLUTION INTRAVENOUS at 08:10

## 2018-10-29 NOTE — PLAN OF CARE
Problem: Patient Care Overview  Goal: Individualization & Mutuality  Outcome: Ongoing (interventions implemented as appropriate)  Patient continues to be monitored for symptoms of GI bleed. No symptoms reported. Patient denies any feelings of abnormality throughout shift. No falls this shift. Patient's family at bedside. Patient expects to be discharged tomorrow morning.

## 2018-10-29 NOTE — PLAN OF CARE
Problem: Patient Care Overview  Goal: Plan of Care Review  Outcome: Ongoing (interventions implemented as appropriate)  Fall prevention precautions maintained, pt remained free of falls throughout shift, call bell and personal items within reach, H & H currently stable, no s/s of active bleeding. 24 hour chart check completed. Will continue to monitor

## 2018-10-29 NOTE — PHYSICIAN QUERY
"PT Name: Tomas Zuniga Jr.  MR #: [id number]    Physician Query Form - Hematology Clarification      CDS/: Wendie Ho RN               Contact information:[email or web address]    This form is a permanent document in the medical record.      Query Date: October 29, 2018    By submitting this query, we are merely seeking further clarification of documentation. Please utilize your independent clinical judgment when addressing the question(s) below.    The Medical record contains the following:   Indicators  Supporting Clinical Findings Location in Medical Record   x "Anemia" documented  Anemic on admission with Hgb 7.7 and received 2 units PRBC Hospital Medicine PN  10/27   x H & H = H & H = 7.7/21.8  H & H = 8.4/24.5  H & H = 8.9/25.9  H & H = 8/23.3 Labs 10/26  Labs 10/27  Labs 10/28  Labs 10/29    BP =                     HR=     x "GI bleeding" documented  Upper Endoscopy 27 October revealed an actively bleeding duodenal ulcer.  Injected epinephrine to control bleeding Hospital Medicine PN 10/28    Acute bleeding (Non GI site)     x Transfusion(s) Transfused with 2 units PRBC's Blood bank record 10/26    Treatment:      Other:        Provider, please specify diagnosis or diagnoses associated with above clinical findings.    X Acute blood loss anemia    Precipitous drop in Hematocrit      Other Hematological Diagnosis (please specify):      Clinically Undetermined       Please document in your progress notes daily for the duration of treatment, until resolved, and include in your discharge summary.                                                                                                      "

## 2018-10-30 NOTE — DISCHARGE SUMMARY
[facility] Hospital Medicine  Discharge Summary      Patient Name: Tomas Zuniga Jr.  MRN: [id number]  Admission Date: 10/26/2018  Hospital Length of Stay: 3 days  Discharge Date and Time: 10/29/2018 10:16 AM  Attending Physician:  Aleksey Amaro MD  Discharging Provider: Aleksey Amaro MD  Primary Care Provider: ISAI Díaz      HPI:   Mr. Zuniga is a 57 yo Black male with history of HTN who presents to the Ed c/o dark tarry stools and abdominal discomfort. He contributes his symptoms to taking his daughter's procardia of 60 mg instead of his usual 10 mg. Upon presentation he was found anemic with H/H of 7/21 and BUN 30 with normal Creatinine. He states he has been having some diarrhea of his dark stools. He is a daily smoker of 1 ppd cigarettes and daily alcohol use. He takes a daily baby aspirin but denies any other NSAIDs.    Procedure(s) (LRB):  EGD (ESOPHAGOGASTRODUODENOSCOPY) (N/A)      Hospital Course:   Admitted for evaluation and treatment of GI bleeding.  Started IV Pantoprazole and held any anti-coagulation.  Evaluation by Gastroenterology and expect endoscopy.  Anemic on admission with Hgb 7.7 and received 2 units PRBC.  Hgb level increased to 8.4 and he remained hemodynamically stable.  Upper Endoscopy 27 October revealed an actively bleeding duodenal ulcer.  Injected epinephrine to control bleeding.  Evening post-procedure Hgb decreased to 7.8, transfused 1 unit PRBC.  Hgb 8.9 the following morning and symptoms resolved.  Continuing IV Pantoprazole and advancing diet.  Discharge plan to follow up with PCP, continue Pantoprazole, abstain from alcohol, and avoid ASA/NSAIDs.  Follow up with GI for repeat endoscopy 12 weeks.     Consults:   Consults (From admission, onward)        Status Ordering Provider     Inpatient consult to Gastroenterology  Once     Provider:  (Not yet assigned)    PONCE Kraus          No new Assessment & Plan notes have  been filed under this hospital service since the last note was generated.  Service: Hospital Medicine    Final Active Diagnoses:    Diagnosis Date Noted POA    PRINCIPAL PROBLEM:  Acute duodenal ulcer with bleeding [K26.0] 10/27/2018 Yes    Hypertension [I10] 10/27/2018 Yes    Tobacco use [Z72.0] 10/27/2018 Yes    Alcohol use [Z78.9] 10/27/2018 Yes    Leukocytosis [D72.829] 10/27/2018 Yes      Problems Resolved During this Admission:       Discharged Condition: good    Disposition: Home or Self Care    Follow Up:  Follow-up Information     Lizette Chaudhry MD In 12 weeks.    Specialties:  Gastroenterology, Hepatology  Why:  Hospital follow up duodenal ulcer already scheduled through GI.  Contact information:  [address]  [place] [address] [phone]             ISAI Díaz In 2 weeks.    Specialty:  Family Medicine  Why:  Hospital follow up bleeding duodenal ulcer and Hypertension management.  Contact information:  [address]  [place] [address] [phone]                 Patient Instructions:      Activity as tolerated       Significant Diagnostic Studies: Labs: All labs within the past 24 hours have been reviewed    Pending Diagnostic Studies:     None         Medications:  Reconciled Home Medications:      Medication List      START taking these medications    pantoprazole 40 MG tablet  Commonly known as:  PROTONIX  Take 1 tablet (40 mg total) by mouth once daily.        CONTINUE taking these medications    NIFEdipine 10 MG Cap  Commonly known as:  PROCARDIA  Take 20 mg by mouth every 8 (eight) hours.        ASK your doctor about these medications    polyethylene glycol [id number] -5.86 gram suspension  Commonly known as:  GoLYTELY,NuLYTELY  Take 4,000 mLs (4 L total) by mouth once. for 1 dose  Ask about: Should I take this medication?            Indwelling Lines/Drains at time of discharge:   Lines/Drains/Airways          None          Time  spent on the discharge of patient: 30 minutes  Patient was seen and examined on the date of discharge and determined to be suitable for discharge.         Aleksey Amaro MD  Department of Hospital Medicine  [facility]

## 2018-10-30 NOTE — PLAN OF CARE
Follow up with Lizette Chaudhry MD in 12 week(s)   Hospital follow up duodenal ulcer already scheduled through GI.  [address] [place] [address]   [phone]    [id number] COLONOSCOPY with Lizette Chaudhry MD   Monday Jan 28, 2019  [address] [place] [address]         10/30/18 0709   Final Note   Assessment Type Final Discharge Note   Anticipated Discharge Disposition Home   Hospital Follow Up  Appt(s) scheduled? Yes

## 2018-11-01 DIAGNOSIS — A04.8 H. PYLORI INFECTION: Primary | ICD-10-CM

## 2018-11-01 RX ORDER — PANTOPRAZOLE SODIUM 40 MG/1
40 TABLET, DELAYED RELEASE ORAL 2 TIMES DAILY
Qty: 60 TABLET | Refills: 1 | Status: ON HOLD | OUTPATIENT
Start: 2018-11-01 | End: 2019-01-28 | Stop reason: SDUPTHER

## 2018-11-01 RX ORDER — AMOXICILLIN 500 MG/1
1000 CAPSULE ORAL EVERY 12 HOURS
Qty: 40 CAPSULE | Refills: 0 | Status: SHIPPED | OUTPATIENT
Start: 2018-11-01 | End: 2018-11-11

## 2018-11-01 RX ORDER — CLARITHROMYCIN 500 MG/1
500 TABLET, FILM COATED ORAL 2 TIMES DAILY
Qty: 20 TABLET | Refills: 0 | Status: SHIPPED | OUTPATIENT
Start: 2018-11-01 | End: 2018-11-11

## 2018-11-01 NOTE — PHYSICIAN QUERY
PT Name: Tomas Zuniga Jr.  MR #: [id number]    Physician Query Form - Pathology Findings Clarification     CDS/: Wendie Ho RN               Contact information:[email or web address]  This form is a permanent document in the medical record.     Query Date: November 1, 2018      By submitting this query, we are merely seeking further clarification of documentation.  Please utilize your independent clinical judgment when addressing the question(s) below.      The medical record contains the following:     Findings Supporting Clinical Information Location in Medical Record   Acute on chronic gastrits FINAL PATHOLOGIC DIAGNOSIS  Gastric biopsy:  Moderate to marked chronic active gastritis.  Focal intestinal metaplasia.  Negative for dysplasia.  Positive for Helicobacter on routine stain.    UGIB likely caused by ASA/EtOH/tobacco use.  Suspect PUD, Erosive Gastritis/Espohagitis, varcies, etc given his history    Acute Duodenal ulcer active bleeding   Gastric biopsy 10/27              Hospital Medicine note 10/28        EGD 10/27     Please document the clinical significance of the Pathologists findings of __acute on chronic gastritis__.          [ X ] I agree with the Pathology Findings        [  ] I do not agree with the Pathology Findings        [  ] Clinically Insignificant        [  ] Clinically Undetermined        [  ] Other/Clarification of Findings: ______________________________________________    Please document in your progress notes daily for the duration of treatment until resolved and include in your discharge summary.

## 2019-01-24 RX ORDER — POLYETHYLENE GLYCOL 3350, SODIUM SULFATE ANHYDROUS, SODIUM BICARBONATE, SODIUM CHLORIDE, POTASSIUM CHLORIDE 236; 22.74; 6.74; 5.86; 2.97 G/4L; G/4L; G/4L; G/4L; G/4L
POWDER, FOR SOLUTION ORAL
Qty: 4000 ML | Refills: 0 | Status: ON HOLD | OUTPATIENT
Start: 2019-01-24 | End: 2019-01-28 | Stop reason: ALTCHOICE

## 2019-01-28 ENCOUNTER — ANESTHESIA EVENT (OUTPATIENT)
Dept: ENDOSCOPY | Facility: HOSPITAL | Age: 59
End: 2019-01-28
Payer: MEDICAID

## 2019-01-28 ENCOUNTER — HOSPITAL ENCOUNTER (OUTPATIENT)
Facility: HOSPITAL | Age: 59
Discharge: HOME | End: 2019-01-28
Attending: INTERNAL MEDICINE | Admitting: INTERNAL MEDICINE
Payer: MEDICAID

## 2019-01-28 ENCOUNTER — ANESTHESIA (OUTPATIENT)
Dept: ENDOSCOPY | Facility: HOSPITAL | Age: 59
End: 2019-01-28
Payer: MEDICAID

## 2019-01-28 VITALS
WEIGHT: 229 LBS | DIASTOLIC BLOOD PRESSURE: 93 MMHG | HEIGHT: 70 IN | BODY MASS INDEX: 32.78 KG/M2 | OXYGEN SATURATION: 100 % | RESPIRATION RATE: 18 BRPM | HEART RATE: 82 BPM | TEMPERATURE: 98 F | SYSTOLIC BLOOD PRESSURE: 133 MMHG

## 2019-01-28 DIAGNOSIS — B96.81 HELICOBACTER PYLORI GASTRITIS: Primary | ICD-10-CM

## 2019-01-28 DIAGNOSIS — K29.70 HELICOBACTER PYLORI GASTRITIS: Primary | ICD-10-CM

## 2019-01-28 DIAGNOSIS — Z12.11 COLON CANCER SCREENING: ICD-10-CM

## 2019-01-28 PROCEDURE — 45380 COLONOSCOPY AND BIOPSY: CPT | Performed by: INTERNAL MEDICINE

## 2019-01-28 PROCEDURE — 00813 ANES UPR LWR GI NDSC PX: CPT | Performed by: INTERNAL MEDICINE

## 2019-01-28 PROCEDURE — 45380 PR COLONOSCOPY,BIOPSY: ICD-10-PCS | Mod: ,,, | Performed by: INTERNAL MEDICINE

## 2019-01-28 PROCEDURE — 43239 EGD BIOPSY SINGLE/MULTIPLE: CPT | Performed by: INTERNAL MEDICINE

## 2019-01-28 PROCEDURE — 88305 TISSUE EXAM BY PATHOLOGIST: CPT | Performed by: PATHOLOGY

## 2019-01-28 PROCEDURE — 37000008 HC ANESTHESIA 1ST 15 MINUTES: Performed by: INTERNAL MEDICINE

## 2019-01-28 PROCEDURE — 25000003 PHARM REV CODE 250: Performed by: INTERNAL MEDICINE

## 2019-01-28 PROCEDURE — 27201012 HC FORCEPS, HOT/COLD, DISP: Performed by: INTERNAL MEDICINE

## 2019-01-28 PROCEDURE — 88305 TISSUE SPECIMEN TO PATHOLOGY - SURGERY: ICD-10-PCS | Mod: 26,,, | Performed by: PATHOLOGY

## 2019-01-28 PROCEDURE — 63600175 PHARM REV CODE 636 W HCPCS: Performed by: NURSE ANESTHETIST, CERTIFIED REGISTERED

## 2019-01-28 PROCEDURE — 37000009 HC ANESTHESIA EA ADD 15 MINS: Performed by: INTERNAL MEDICINE

## 2019-01-28 PROCEDURE — 43239 PR EGD, FLEX, W/BIOPSY, SGL/MULTI: ICD-10-PCS | Mod: 51,,, | Performed by: INTERNAL MEDICINE

## 2019-01-28 PROCEDURE — 88305 TISSUE EXAM BY PATHOLOGIST: CPT | Mod: 26,,, | Performed by: PATHOLOGY

## 2019-01-28 PROCEDURE — 45380 COLONOSCOPY AND BIOPSY: CPT | Mod: ,,, | Performed by: INTERNAL MEDICINE

## 2019-01-28 PROCEDURE — 43239 EGD BIOPSY SINGLE/MULTIPLE: CPT | Mod: 51,,, | Performed by: INTERNAL MEDICINE

## 2019-01-28 PROCEDURE — 25000003 PHARM REV CODE 250: Performed by: NURSE ANESTHETIST, CERTIFIED REGISTERED

## 2019-01-28 RX ORDER — SODIUM CHLORIDE, SODIUM LACTATE, POTASSIUM CHLORIDE, CALCIUM CHLORIDE 600; 310; 30; 20 MG/100ML; MG/100ML; MG/100ML; MG/100ML
INJECTION, SOLUTION INTRAVENOUS CONTINUOUS
Status: DISCONTINUED | OUTPATIENT
Start: 2019-01-28 | End: 2019-01-28 | Stop reason: HOSPADM

## 2019-01-28 RX ORDER — PROPOFOL 10 MG/ML
VIAL (ML) INTRAVENOUS
Status: DISCONTINUED | OUTPATIENT
Start: 2019-01-28 | End: 2019-01-28

## 2019-01-28 RX ORDER — LIDOCAINE HYDROCHLORIDE 10 MG/ML
INJECTION INFILTRATION; PERINEURAL
Status: DISCONTINUED | OUTPATIENT
Start: 2019-01-28 | End: 2019-01-28

## 2019-01-28 RX ORDER — PANTOPRAZOLE SODIUM 40 MG/1
40 TABLET, DELAYED RELEASE ORAL DAILY
Qty: 60 TABLET | Refills: 1 | Status: SHIPPED | OUTPATIENT
Start: 2019-01-28 | End: 2019-04-28

## 2019-01-28 RX ORDER — SODIUM CHLORIDE 0.9 % (FLUSH) 0.9 %
3 SYRINGE (ML) INJECTION
Status: DISCONTINUED | OUTPATIENT
Start: 2019-01-28 | End: 2019-01-28 | Stop reason: HOSPADM

## 2019-01-28 RX ADMIN — PROPOFOL 50 MG: 10 INJECTION, EMULSION INTRAVENOUS at 09:01

## 2019-01-28 RX ADMIN — LIDOCAINE HYDROCHLORIDE 50 MG: 10 INJECTION, SOLUTION INFILTRATION; PERINEURAL at 09:01

## 2019-01-28 RX ADMIN — SODIUM CHLORIDE, SODIUM LACTATE, POTASSIUM CHLORIDE, AND CALCIUM CHLORIDE: .6; .31; .03; .02 INJECTION, SOLUTION INTRAVENOUS at 08:01

## 2019-01-28 NOTE — ANESTHESIA POSTPROCEDURE EVALUATION
"Anesthesia Post Evaluation    Patient: Tomas Zuniga Jr.    Procedure(s) Performed: Procedure(s) (LRB):  COLONOSCOPY (N/A)  EGD (ESOPHAGOGASTRODUODENOSCOPY) (N/A)    Final Anesthesia Type: MAC  Patient location during evaluation: GI PACU  Patient participation: Yes- Able to Participate  Level of consciousness: awake and alert and oriented  Post-procedure vital signs: reviewed and stable  Pain management: adequate  Airway patency: patent  PONV status at discharge: No PONV  Anesthetic complications: no      Cardiovascular status: blood pressure returned to baseline  Respiratory status: unassisted, room air and spontaneous ventilation  Hydration status: euvolemic  Follow-up not needed.        Visit Vitals  BP (!) 133/93 (BP Location: Left arm, Patient Position: Lying)   Pulse 82   Temp 36.8 °C (98.2 °F) (Temporal)   Resp 18   Ht 5' 10" (1.778 m)   Wt 103.9 kg (229 lb)   SpO2 100%   BMI 32.86 kg/m²       Pain/Edward Score: Edward Score: 10 (1/28/2019 10:02 AM)        "

## 2019-01-28 NOTE — [employer] PATIENT INSTRUCTIONS
Discharge Summary/Instructions after an Endoscopic Procedure  Patient Name: Tomas Zuniga  Patient MRN: [id number]  Patient YOB: 1960 Monday, January 28, 2019 Lizette Chaudhry MD  RESTRICTIONS:  During your procedure today, you received medications for sedation.  These   medications may affect your judgment, balance and coordination.  Therefore,   for 24 hours, you have the following restrictions:   - DO NOT drive a car, operate machinery, make legal/financial decisions,   sign important papers or drink alcohol.    ACTIVITY:  Today: no heavy lifting, straining or running due to procedural   sedation/anesthesia.  The following day: return to full activity including work.  DIET:  Eat and drink normally unless instructed otherwise.     TREATMENT FOR COMMON SIDE EFFECTS:  - Mild abdominal pain, nausea, belching, bloating or excessive gas:  rest,   eat lightly and use a heating pad.  - Sore Throat: treat with throat lozenges and/or gargle with warm salt   water.  - Because air was used during the procedure, expelling large amounts of air   from your rectum or belching is normal.  - If a bowel prep was taken, you may not have a bowel movement for 1-3 days.    This is normal.  SYMPTOMS TO WATCH FOR AND REPORT TO YOUR PHYSICIAN:  1. Abdominal pain or bloating, other than gas cramps.  2. Chest pain.  3. Back pain.  4. Signs of infection such as: chills or fever occurring within 24 hours   after the procedure.  5. Rectal bleeding, which would show as bright red, maroon, or black stools.   (A tablespoon of blood from the rectum is not serious, especially if   hemorrhoids are present.)  6. Vomiting.  7. Weakness or dizziness.  GO DIRECTLY TO THE NEAREST EMERGENCY ROOM IF YOU HAVE ANY OF THE FOLLOWING:      Difficulty breathing              Chills and/or fever over 101 F   Persistent vomiting and/or vomiting blood   Severe abdominal pain   Severe chest pain   Black, tarry stools   Bleeding- more than one  tablespoon   Any other symptom or condition that you feel may need urgent attention  Your doctor recommends these additional instructions:  If any biopsies were taken, your doctors clinic will contact you in 1 to 2   weeks with any results.  - Patient has a contact number available for emergencies.  The signs and   symptoms of potential delayed complications were discussed with the   patient.  Return to normal activities tomorrow.  Written discharge   instructions were provided to the patient.   - Resume previous diet.   - Continue present medications.   - Await pathology results.   - Repeat colonoscopy in 5 years for surveillance based on pathology results.     - Return to referring physician PRN.   - No aspirin, ibuprofen, naproxen, or other non-steroidal anti-inflammatory   drugs for 5 days after biopsy.   - Discharge patient to home.  For questions, problems or results please call your physician Lizette Chaudhry MD at Work:  [phone]  If you have any questions about the above instructions, call the GI   department at [phone] or call the endoscopy unit at [phone]   from 7am until 3 pm.  [facility] [place], EMERGENCY ROOM PHONE NUMBER:   [phone]  IF A COMPLICATION OR EMERGENCY SITUATION ARISES AND YOU ARE UNABLE TO REACH   YOUR PHYSICIAN - GO DIRECTLY TO THE EMERGENCY ROOM.  I have read or have had read to me these discharge instructions for my   procedure and have received a written copy.  I understand these   instructions and will follow-up with my physician if I have any questions.     __________________________________       _____________________________________  Nurse Signature                                          Patient/Designated   Responsible Party Signature  Lizette Chaudhry MD  1/28/2019 9:51:48 AM  This report has been verified and signed electronically.  [employer]

## 2019-01-28 NOTE — ANESTHESIA RELEASE NOTE
"Anesthesia Release from PACU Note    Patient: Tomas Zuniga Jr.    Procedure(s) Performed: Procedure(s) (LRB):  COLONOSCOPY (N/A)  EGD (ESOPHAGOGASTRODUODENOSCOPY) (N/A)    Anesthesia type: MAC    Post pain: Adequate analgesia    Post assessment: no apparent anesthetic complications, tolerated procedure well and no evidence of recall    Last Vitals:   Visit Vitals  BP (!) 133/93 (BP Location: Left arm, Patient Position: Lying)   Pulse 82   Temp 36.8 °C (98.2 °F) (Temporal)   Resp 18   Ht 5' 10" (1.778 m)   Wt 103.9 kg (229 lb)   SpO2 100%   BMI 32.86 kg/m²       Post vital signs: stable    Level of consciousness: awake, alert  and oriented    Nausea/Vomiting: no nausea/no vomiting    Complications: none    Airway Patency: patent    Respiratory: unassisted, spontaneous ventilation, room air    Cardiovascular: stable and blood pressure at baseline    Hydration: euvolemic  "

## 2019-01-28 NOTE — H&P
Short Stay Endoscopy History and Physical    PCP - ISAI Díaz    Procedure - EGD/colonoscopy    EGD for re-eval of rpossibole Baeza's and f/u of H pylori. Needs colon cancer screening, none previous.    ROS:  Constitutional: No fevers, chills, No weight loss  ENT: No allergies  CV: No chest pain  Pulm: No cough, No shortness of breath  Ophtho: No vision changes  GI: see HPI  Derm: No rash  Heme: No lymphadenopathy, No bruising  MSK: No arthritis  : No dysuria, No hematuria  Endo: No hot or cold intolerance  Neuro: No syncope, No seizure  Psych: No anxiety, No depression    Medical History:  has a past medical history of Arthritis, Cardiac hypertrophy, Encounter for blood transfusion, and Hypertension.    Surgical History:  has a past surgical history that includes Esophagogastroduodenoscopy (N/A, 10/27/2018).    Family History: family history is not on file.. Otherwise no colon cancer, inflammatory bowel disease, or GI malignancies.    Social History:  reports that he has been smoking cigarettes.  He has a 15.00 pack-year smoking history. he has never used smokeless tobacco. He reports that he drinks about 3.6 oz of alcohol per week. He reports that he does not use drugs.    Review of patient's allergies indicates:  No Known Allergies    Medications:   Medications Prior to Admission   Medication Sig Dispense Refill Last Dose    NIFEdipine (PROCARDIA) 10 MG Cap Take 20 mg by mouth every 8 (eight) hours.   1/27/2019 at Unknown time    pantoprazole (PROTONIX) 40 MG tablet Take 1 tablet (40 mg total) by mouth 2 (two) times daily. 60 tablet 1 More than a month at Unknown time       Objective Findings:    Vital Signs:   Vitals:    01/28/19 0831   BP: (!) 161/96   Pulse: 75   Resp: 18   Temp: 98.2 °F (36.8 °C)         Physical Exam:  General Appearance: Well appearing in no acute distress  Eyes:    No scleral icterus  ENT: Neck supple, Lips, mucosa, and tongue normal; teeth and gums normal  Lungs: CTA  bilaterally in anterior and posterior fields, no wheezes, no crackles.  Heart:  Regular rate, S1, S2 normal, no murmurs heard.  Abdomen: Soft, non tender, non distended with normal bowel sounds. No hepatosplenomegaly, ascites, or mass.  Extremities: No clubbing, cyanosis or edema  Skin: No rash    Labs:  Lab Results   Component Value Date    WBC 11.70 10/29/2018    HGB 8.0 (L) 10/29/2018    HCT 23.3 (L) 10/29/2018     10/29/2018    ALT 43 10/26/2018    AST 41 (H) 10/26/2018     10/29/2018    K 3.3 (L) 10/29/2018     10/29/2018    CREATININE 0.9 10/29/2018    BUN 13 10/29/2018    CO2 25 10/29/2018    INR 1.1 10/26/2018       I have explained the risks and benefits of endoscopy procedures to the patient including but not limited to bleeding, perforation, infection, and death.    Proceed with EGD for eval of esophagus and average risk colon cancer screening.

## 2019-01-28 NOTE — DISCHARGE INSTRUCTIONS
Hemorrhoids    Hemorrhoids are swollen and inflamed veins inside the rectum and near the anus. The rectum is the last several inches of the colon. The anus is the passage between the rectum and the outside of the body.  Causes  The veins can become swollen due to increased pressure in them. This is most often caused by:  · Chronic constipation or diarrhea  · Straining when having a bowel movement  · Sitting too long on the toilet  · A low-fiber diet  · Pregnancy  Symptoms  · Bleeding from the rectum (this may be noticeable after bowel movements)  · Lump near the anus  · Itching around the anus  · Pain around the anus  There are different types of hemorrhoids. Depending on the type you have and the severity, you may be able to treat yourself at home. In some cases, a procedure may be the best treatment option. Your healthcare provider can tell you more about this, if needed.  Home care  General care  · To get relief from pain or itching, try:  ¨ Topical products. Your healthcare provider may prescribe or recommend creams, ointments, or pads that can be applied to the hemorrhoid. Use these exactly as directed.  ¨ Medicines. Your healthcare provider may recommend stool softeners, suppositories, or laxatives to help manage constipation. Use these exactly as directed.  ¨ Sitz baths. A sitz bath involves sitting in a few inches of warm bath water. Be careful not to make the water so hot that you burn yourself--test it before sitting in it. Soak for about 10 to 15 minutes a few times a day. This may help relieve pain.  Tips to help prevent hemorrhoids  · Eat more fiber. Fiber adds bulk to stool and absorbs water as it moves through your colon. This makes stool softer and easier to pass.  ¨ Increase the fiber in your diet with more fiber-rich foods. These include fresh fruit, vegetables, and whole grains.  ¨ Take a fiber supplement or bulking agent, if advised to by your provider. These include products such as psyllium  or methylcellulose.  · Drink plenty of water, if directed to by your provider. This can help keep stool soft.  · Be more active. Frequent exercise aids digestion and helps prevent constipation. It may also help make bowel movements more regular.  · Dont strain during bowel movements. This can make hemorrhoids more likely. Also, dont sit on the toilet for long periods of time.  Follow-up care  Follow up with your healthcare provider, or as advised. If a culture or imaging tests were done, you will be notified of the results when they are ready. This may take a few days or longer.  When to seek medical advice  Call your healthcare provider right away if any of these occur:  · Increased bleeding from the rectum  · Increased pain around the rectum or anus  · Weakness or dizziness  Call 911  Call 911 or return to the emergency department right away if any of these occur:  · Trouble breathing or swallowing  · Fainting or loss of consciousness  · Unusually fast heart rate  · Vomiting blood  · Large amounts of blood in stool  Date Last Reviewed: 6/22/2015 © 2000-2017 [employer]. [address] [place] [address]. All rights reserved. This information is not intended as a substitute for professional medical care. Always follow your healthcare professional's instructions.        Understanding Colon and Rectal Polyps    The colon (also called the large intestine) is a muscular tube that forms the last part of the digestive tract. It absorbs water and stores food waste. The colon is about 4 to 6 feet long. The rectum is the last 6 inches of the colon. The colon and rectum have a smooth lining composed of millions of cells. Changes in these cells can lead to growths in the colon that can become cancerous and should be removed. Multiple tests are available to screen for colon cancer, but the colonoscopy is the most recommended test. During colonoscopy, these polyps can be removed. How often you need this  test depends on many things including your condition, your family history, symptoms, and what the findings were at the previous colonoscopy.   When the colon lining changes  Changes that happen in the cells that line the colon or rectum can lead to growths called polyps. Over a period of years, polyps can turn cancerous. Removing polyps early may prevent cancer from ever forming.  Polyps  Polyps are fleshy clumps of tissue that form on the lining of the colon or rectum. Small polyps are usually benign (not cancerous). However, over time, cells in a polyp can change and become cancerous. Certain types of polyps known as adenomatous polyps are premalignant. The risk for invasive cancer increases with the size of the polyp and certain cell and gene features. This means that they can become cancerous if they're not removed. Hyperplastic polyps are benign. They can grow quite large and not turn cancerous.   Cancer  Almost all colorectal cancers start when polyp cells begin growing abnormally. As a cancerous tumor grows, it may involve more and more of the colon or rectum. In time, cancer can also grow beyond the colon or rectum and spread to nearby organs or to glands called lymph nodes. The cells can also travel to other parts of the body. This is known as metastasis. The earlier a cancerous tumor is removed, the better the chance of preventing its spread.    Date Last Reviewed: 8/1/2016 © 2000-2017 [employer]. [address] [place] [address], [place] [address]. All rights reserved. This information is not intended as a substitute for professional medical care. Always follow your healthcare professional's instructions.        Gastritis (Adult)    Gastritis is inflammation and irritation of the stomach lining. It can be present for a short time (acute) or be long lasting (chronic). Gastritis is often caused by infection with bacteria called H pylori. More than a third of people in the [place] have this bacteria in  their bodies. In many cases, H pylori causes no problems or symptoms. In some people, though, the infection irritates the stomach lining and causes gastritis. Other causes of stomach irritation include drinking alcohol or taking pain-relieving medicines called NSAIDs (such as aspirin or ibuprofen).   Symptoms of gastritis can include:  · Abdominal pain or bloating  · Loss of appetite  · Nausea or vomiting  · Vomiting blood or having black stools  · Feeling more tired than usual  An inflamed and irritated stomach lining is more likely to develop a sore called an ulcer. To help prevent this, gastritis should be treated.  Home care  If needed, medicines may be prescribed. If you have H pylori infection, treating it will likely relieve your symptoms. Other changes can help reduce stomach irritation and help it heal.  · If you have been prescribed medicines for H pylori infection, take them as directed. Take all of the medicine until it is finished or your healthcare provider tells you to stop, even if you feel better.  · Your healthcare provider may recommend avoiding NSAIDs. If you take daily aspirin for your heart or other medical reasons, do not stop without talking to your healthcare provider first.  · Avoid drinking alcohol.  · Stop smoking. Smoking can irritate the stomach and delay healing. As much as possible, stay away from second hand smoke.  Follow-up care  Follow up with your healthcare provider, or as advised by our staff. Testing may be needed to check for inflammation or an ulcer.  When to seek medical advice  Call your healthcare provider for any of the following:  · Stomach pain that gets worse or moves to the lower right abdomen (appendix area)  · Chest pain that appears or gets worse, or spreads to the back, neck, shoulder, or arm  · Frequent vomiting (cant keep down liquids)  · Blood in the stool or vomit (red or black in color)  · Feeling weak or dizzy  · Fever of 100.4ºF (38ºC) or higher, or as  directed by your healthcare provider  Date Last Reviewed: 6/22/2015  © 5705-6396 [employer]. [address] [place] [address], [place] [address]. All rights reserved. This information is not intended as a substitute for professional medical care. Always follow your healthcare professional's instructions.        What Is a Hiatal Hernia?    Hiatal hernia is when the area where the stomach and esophagus meet bulges up through the diaphragm into the chest cavity. In some cases, part of the stomach may bulge above the diaphragm. Stomach acid may move up into the esophagus and cause symptoms. The symptoms are often blamed on gastroesophageal reflux disease (GERD). You may only know about the hernia when it shows up on an X-ray taken for other reasons.   What you may feel  The hiatus is a normal hole in the diaphragm. The esophagus passes through this hole and leads to the stomach. In some cases, part of the stomach may bulge above the diaphragm. This bulge is called a hernia. Stomach acid may move up into the esophagus and cause symptoms.  When you eat, the muscle at the hiatus relaxes to allow food to pass into the stomach. It tightens again to keep food and digestive acids in the stomach.  Many people with hiatal hernias have mild symptoms. You may notice the following GERD symptoms:  · Heartburn or other chest discomfort  · A feeling of chest fullness after a meal  · Frequent burping  · Acid taste in the mouth  · Trouble swallowing  Treating symptoms  If you have been diagnosed with hiatal hernia, these suggestions may help improve symptoms:  · Lose excess weight. Extra weight puts pressure on the stomach and esophagus.  · Dont lie down after eating. Sit up for at least an hour after eating. Lying down after eating can increase symptoms.  · Avoid certain foods and drinks. These include fatty foods, chocolate, coffee, mint, and other foods that cause symptoms for you.  · Dont smoke or drink alcohol. These can  worsen symptoms.  · Look at your medicines. Discuss your medicines with your healthcare provider. Many medicines can cause symptoms.  · Consider an antacid medicine. Ask your healthcare provider about over-the-counter and prescription medicines that may help.  · Ask about surgery, if needed. Surgery is a treatment choice for some people. Your healthcare provider can determine if surgery is an option for you.    Date Last Reviewed: 10/1/2016  © 2433-7016 [employer]. [address] [place] [address], [place] [address]. All rights reserved. This information is not intended as a substitute for professional medical care. Always follow your healthcare professional's instructions.

## 2019-01-28 NOTE — TRANSFER OF CARE
"Anesthesia Transfer of Care Note    Patient: Tomas Zuniga Jr.    Procedure(s) Performed: Procedure(s) (LRB):  COLONOSCOPY (N/A)  EGD (ESOPHAGOGASTRODUODENOSCOPY) (N/A)    Patient location: GI    Anesthesia Type: MAC    Transport from OR: Transported from OR on room air with adequate spontaneous ventilation    Post pain: adequate analgesia    Post assessment: no apparent anesthetic complications    Post vital signs: stable    Level of consciousness: awake, alert and oriented    Nausea/Vomiting: no nausea/vomiting    Complications: none    Transfer of care protocol was followed      Last vitals:   Visit Vitals  /61 (BP Location: Left arm, Patient Position: Lying)   Pulse 82   Temp 36.8 °C (98.2 °F) (Temporal)   Resp 18   Ht 5' 10" (1.778 m)   Wt 103.9 kg (229 lb)   SpO2 (!) 90%   BMI 32.86 kg/m²     "

## 2019-01-28 NOTE — ANESTHESIA PREPROCEDURE EVALUATION
01/28/2019  Tomas Zuniga Jr. is a 58 y.o., male.    Anesthesia Evaluation    I have reviewed the Patient Summary Reports.    I have reviewed the Nursing Notes.   I have reviewed the Medications.     Review of Systems  Anesthesia Hx:  No problems with previous Anesthesia    Social:  Smoker    Cardiovascular:   Hypertension ECG has been reviewed.  Hypertension, Essential Hypertension    Hepatic/GI:   Bowel Prep. PUD,  Esophageal / Stomach Disorders Peptic Ulcer Disease    Endocrine:  Metabolic Disorders, Obesity / BMI > 30      Physical Exam  General:  Obesity    Airway/Jaw/Neck:  Airway Findings: Mouth Opening: Normal Tongue: Normal  General Airway Assessment: Adult       Chest/Lungs:  Chest/Lungs Findings: Normal Respiratory Rate     Heart/Vascular:  Heart Findings: Rate: Normal             Anesthesia Plan  Type of Anesthesia, risks & benefits discussed:  Anesthesia Type:  MAC  Patient's Preference:   Intra-op Monitoring Plan: standard ASA monitors  Intra-op Monitoring Plan Comments:   Post Op Pain Control Plan:   Post Op Pain Control Plan Comments:   Induction:   IV  Beta Blocker:  Patient is not currently on a Beta-Blocker (No further documentation required).       Informed Consent: Patient understands risks and agrees with Anesthesia plan.  Questions answered.   ASA Score: 2     Day of Surgery Review of History & Physical: I have interviewed and examined the patient. I have reviewed the patient's H&P dated:  There are no significant changes.          Ready For Surgery From Anesthesia Perspective.

## 2019-01-28 NOTE — [employer] PATIENT INSTRUCTIONS
Discharge Summary/Instructions after an Endoscopic Procedure  Patient Name: Tomas Zuniga  Patient MRN: [id number]  Patient YOB: 1960 Monday, January 28, 2019 Lizette Chaudhry MD  RESTRICTIONS:  During your procedure today, you received medications for sedation.  These   medications may affect your judgment, balance and coordination.  Therefore,   for 24 hours, you have the following restrictions:   - DO NOT drive a car, operate machinery, make legal/financial decisions,   sign important papers or drink alcohol.    ACTIVITY:  Today: no heavy lifting, straining or running due to procedural   sedation/anesthesia.  The following day: return to full activity including work.  DIET:  Eat and drink normally unless instructed otherwise.     TREATMENT FOR COMMON SIDE EFFECTS:  - Mild abdominal pain, nausea, belching, bloating or excessive gas:  rest,   eat lightly and use a heating pad.  - Sore Throat: treat with throat lozenges and/or gargle with warm salt   water.  - Because air was used during the procedure, expelling large amounts of air   from your rectum or belching is normal.  - If a bowel prep was taken, you may not have a bowel movement for 1-3 days.    This is normal.  SYMPTOMS TO WATCH FOR AND REPORT TO YOUR PHYSICIAN:  1. Abdominal pain or bloating, other than gas cramps.  2. Chest pain.  3. Back pain.  4. Signs of infection such as: chills or fever occurring within 24 hours   after the procedure.  5. Rectal bleeding, which would show as bright red, maroon, or black stools.   (A tablespoon of blood from the rectum is not serious, especially if   hemorrhoids are present.)  6. Vomiting.  7. Weakness or dizziness.  GO DIRECTLY TO THE NEAREST EMERGENCY ROOM IF YOU HAVE ANY OF THE FOLLOWING:      Difficulty breathing              Chills and/or fever over 101 F   Persistent vomiting and/or vomiting blood   Severe abdominal pain   Severe chest pain   Black, tarry stools   Bleeding- more than one  tablespoon   Any other symptom or condition that you feel may need urgent attention  Your doctor recommends these additional instructions:  If any biopsies were taken, your doctors clinic will contact you in 1 to 2   weeks with any results.  - Patient has a contact number available for emergencies.  The signs and   symptoms of potential delayed complications were discussed with the   patient.  Return to normal activities tomorrow.  Written discharge   instructions were provided to the patient.   - Resume previous diet.   - Continue present medications.   - Await pathology results.   - No aspirin, ibuprofen, naproxen, or other non-steroidal anti-inflammatory   drugs for 5 days after biopsy. Will decide if Aspirin can be restarted   based on pathology results.  - Return to referring physician PRN.   - Discharge patient to home.  For questions, problems or results please call your physician Lizette Chaudhry MD at Work:  [phone]  If you have any questions about the above instructions, call the GI   department at [phone] or call the endoscopy unit at [phone]   from 7am until 3 pm.  [facility], EMERGENCY ROOM PHONE NUMBER:   [phone]  IF A COMPLICATION OR EMERGENCY SITUATION ARISES AND YOU ARE UNABLE TO REACH   YOUR PHYSICIAN - GO DIRECTLY TO THE EMERGENCY ROOM.  I have read or have had read to me these discharge instructions for my   procedure and have received a written copy.  I understand these   instructions and will follow-up with my physician if I have any questions.     __________________________________       _____________________________________  Nurse Signature                                          Patient/Designated   Responsible Party Signature  Lizette Chaudhry MD  1/28/2019 9:23:28 AM  This report has been verified and signed electronically.  [employer]

## 2019-02-14 ENCOUNTER — TELEPHONE (OUTPATIENT)
Dept: GASTROENTEROLOGY | Facility: CLINIC | Age: 59
End: 2019-02-14

## 2019-02-14 NOTE — TELEPHONE ENCOUNTER
----- Message from Rhina Martins sent at 2/14/2019  7:31 AM CST -----  .Type:  Patient Returning Call    Who Called:pt  Who Left Message for Patient:n/a  Does the patient know what this is regarding?:infection discovered during colonoscopy  Would the patient rather a call back or a response via [facility]? callback  Best Call Back Number:.[phone] (home)   Additional Information:

## 2019-02-14 NOTE — TELEPHONE ENCOUNTER
Left message on answering machine to return a call to Dr. Chaudhry's office at [facility], [facility]. Pylori instructions, [facility].

## 2019-04-01 ENCOUNTER — TELEPHONE (OUTPATIENT)
Dept: GASTROENTEROLOGY | Facility: CLINIC | Age: 59
End: 2019-04-01

## 2024-09-19 NOTE — CONSULTS
[facility] -   Gastroenterology  Consult Note    Patient Name: Tomas Zuniga Jr.  MRN: [id number]  Admission Date: 10/26/2018  Hospital Length of Stay: 1 days  Code Status: Full Code   Attending Provider: Aleksey Amaro MD   Consulting Provider: Lizette Chaudhry MD  Primary Care Physician: ISAI Díaz  Principal Problem:GI bleed    Inpatient consult to Gastroenterology  Consult performed by: Lizette Chaudhry MD  Consult ordered by: Roberta Nogueira MD  Reason for consult: melena  Assessment/Recommendations: EGD        Subjective:     HPI:  58M admitted with anemia, HB to ~7.7. He reports 3 days of melena, reports stools as black.  He was also noticing increasing NUNO and weakness. He denies any chest pain or abdominal pain. No acute issues at this time. Last melenic stool was last night.  Does take ASA 81mg but no other antiplt or anticaog.    Past Medical History:   Diagnosis Date    Cardiac hypertrophy     Hypertension        History reviewed. No pertinent surgical history.    Review of patient's allergies indicates:  No Known Allergies  Family History     None        Tobacco Use    Smoking status: Current Every Day Smoker     Packs/day: 1.00     Types: Cigarettes    Smokeless tobacco: Never Used   Substance and Sexual Activity    Alcohol use: Yes     Alcohol/week: 3.6 oz     Types: 6 Cans of beer per week    Drug use: No    Sexual activity: Not on file     Review of Systems   HENT: Negative.    Eyes: Negative.    Respiratory: Negative.    Cardiovascular: Negative.    Gastrointestinal: Positive for blood in stool.   Genitourinary: Negative.    Musculoskeletal: Negative.    Skin: Negative.    Neurological: Positive for weakness.   Psychiatric/Behavioral: Negative.      Objective:     Vital Signs (Most Recent):  Temp: 99.5 °F (37.5 °C) (10/27/18 0659)  Pulse: 96 (10/27/18 0902)  Resp: 19 (10/27/18 0902)  BP: 116/75 (10/27/18 0902)  SpO2: 99 % (10/27/18 0902) Vital Signs (24h  [facility]  sent Rx request for the following:      Requested Prescriptions   Pending Prescriptions Disp Refills    LYRICA 200 MG capsule [Pharmacy Med Name: Lyrica 200 MG Oral Capsule] 90 capsule 0     Sig: TAKE 1 CAPSULE BY MOUTH THREE TIMES DAILY       There is no refill protocol information for this order   Last Prescription Date:   3/12/24  Last Fill Qty/Refills:         90, R-5           EQ PAIN RELIEVER EX  MG tablet [Pharmacy Med Name: EQ Pain Reliever Ex St 500 MG Oral Tablet] 100 tablet 0     Sig: TAKE 1 TABLET BY MOUTH EVERY 6 HOURS AS NEEDED FOR PAIN . DO NOT EXCEED 4000 MG OF ACETAMINOPHEN PER 24 HOURS       Analgesics (Non-Narcotic Tylenol and ASA Only) Passed - 9/19/2024  3:44 PM       Last Prescription Date:   8/16/24  Last Fill Qty/Refills:         100, R-0    Last Office Visit:              6/26/24   Future Office visit:             Next 5 appointments (look out 90 days)      Sep 27, 2024 10:20 AM  (Arrive by 10:05 AM)  Provider Visit with Kira Grider, [facility] ([facility] ) [address]  [place] [address]  [phone]           Routing refill request to provider for review/approval because:  Drug not on the [facility] refill protocol     Maria G Maza RN on 9/19/2024 at 3:46 PM           Range):  Temp:  [98.4 °F (36.9 °C)-99.5 °F (37.5 °C)] 99.5 °F (37.5 °C)  Pulse:  [] 96  Resp:  [16-43] 19  SpO2:  [98 %-100 %] 99 %  BP: ()/(51-85) 116/75     Weight: 103 kg (227 lb) (10/26/18 1857)  Body mass index is 31.66 kg/m².      Intake/Output Summary (Last 24 hours) at 10/27/2018 1221  Last data filed at 10/27/2018 0400  Gross per 24 hour   Intake 650 ml   Output --   Net 650 ml       Lines/Drains/Airways     Peripheral Intravenous Line                 Peripheral IV - Single Lumen 10/26/18 1821 Left Antecubital less than 1 day         Peripheral IV - Single Lumen 10/26/18 2329 Right Antecubital less than 1 day                Physical Exam   Constitutional: He is oriented to person, place, and time. He appears well-developed and well-nourished. No distress.   HENT:   Head: Normocephalic and atraumatic.   Mouth/Throat: Oropharynx is clear and moist. No oropharyngeal exudate.   Eyes: Conjunctivae are normal. Pupils are equal, round, and reactive to light. Right eye exhibits no discharge. Left eye exhibits no discharge. No scleral icterus.   Pulmonary/Chest: Effort normal and breath sounds normal. No respiratory distress. He has no wheezes.   Abdominal: Soft. He exhibits no distension. There is no tenderness.   Musculoskeletal: He exhibits no edema.   Neurological: He is alert and oriented to person, place, and time.   Psychiatric: He has a normal mood and affect. His behavior is normal.   Vitals reviewed.      Significant Labs:  CBC:   Recent Labs   Lab 10/27/18  0200 10/27/18  0410 10/27/18  1000   WBC 15.65* 14.40* 13.24*   HGB 7.9* 8.4* 8.1*   HCT 22.5* 24.5* 22.9*    152 161     CMP:   Recent Labs   Lab 10/26/18  1850      CALCIUM 7.8*   ALBUMIN 2.4*   PROT 5.0*      K 3.9   CO2 22*      BUN 30*   CREATININE 1.0   ALKPHOS 51*   ALT 43   AST 41*   BILITOT 0.2       Significant Imaging:  n/a    Assessment/Plan:     * GI bleed    Melena, without ongoing bleeding at this  time but did not respond appropriately to 2 units pRBCs.   -Plan for EGD today to eval source of melena  -Monitor Hb closely and maintain >8  -Continue PPI IV  -Avoid ASA/NSAIDS         Thank you for your consult. I will follow-up with patient. Please contact us if you have any additional questions.    Lizette Chaudhry MD  Gastroenterology  [facility]